# Patient Record
Sex: FEMALE | Race: WHITE | Employment: FULL TIME | URBAN - METROPOLITAN AREA
[De-identification: names, ages, dates, MRNs, and addresses within clinical notes are randomized per-mention and may not be internally consistent; named-entity substitution may affect disease eponyms.]

---

## 2017-01-04 RX ORDER — LISINOPRIL 5 MG/1
5 TABLET ORAL DAILY
Qty: 30 TAB | Refills: 0 | Status: SHIPPED | OUTPATIENT
Start: 2017-01-04 | End: 2017-04-04 | Stop reason: SDUPTHER

## 2017-01-04 RX ORDER — LISINOPRIL 5 MG/1
5 TABLET ORAL DAILY
Qty: 90 TAB | Refills: 0 | Status: SHIPPED | OUTPATIENT
Start: 2017-01-04 | End: 2017-01-04 | Stop reason: SDUPTHER

## 2017-01-04 NOTE — TELEPHONE ENCOUNTER
Refill is per verbal order of Dr. Rod Thao.    Requested Prescriptions     Pending Prescriptions Disp Refills    lisinopril (PRINIVIL, ZESTRIL) 5 mg tablet 30 Tab 0     Sig: Take 1 Tab by mouth daily. Signed Prescriptions Disp Refills    lisinopril (PRINIVIL, ZESTRIL) 5 mg tablet 90 Tab 0     Sig: Take 1 Tab by mouth daily.  NEEDS APPOINTMENT TO CONTINUE REFILLS     Authorizing Provider: Yvrose May     Ordering User: Roberto Mehta

## 2017-04-04 ENCOUNTER — TELEPHONE (OUTPATIENT)
Dept: CARDIOLOGY CLINIC | Age: 57
End: 2017-04-04

## 2017-04-04 RX ORDER — LISINOPRIL 5 MG/1
5 TABLET ORAL DAILY
Qty: 30 TAB | Refills: 0 | Status: SHIPPED | OUTPATIENT
Start: 2017-04-04 | End: 2017-05-26 | Stop reason: SDUPTHER

## 2017-04-04 NOTE — TELEPHONE ENCOUNTER
Refill is per verbal order of Dr. Jd Iglesias.    Requested Prescriptions     Pending Prescriptions Disp Refills    lisinopril (PRINIVIL, ZESTRIL) 5 mg tablet 30 Tab 0     Sig: Take 1 Tab by mouth daily.

## 2017-04-14 ENCOUNTER — OFFICE VISIT (OUTPATIENT)
Dept: CARDIOLOGY CLINIC | Age: 57
End: 2017-04-14

## 2017-04-14 VITALS
HEART RATE: 64 BPM | HEIGHT: 61 IN | WEIGHT: 181.6 LBS | BODY MASS INDEX: 34.29 KG/M2 | SYSTOLIC BLOOD PRESSURE: 145 MMHG | DIASTOLIC BLOOD PRESSURE: 70 MMHG

## 2017-04-14 DIAGNOSIS — Z98.890 H/O AORTIC ANEURYSM REPAIR: Primary | ICD-10-CM

## 2017-04-14 DIAGNOSIS — Z86.79 H/O AORTIC ANEURYSM REPAIR: Primary | ICD-10-CM

## 2017-04-14 DIAGNOSIS — I34.0 MITRAL VALVE INSUFFICIENCY, UNSPECIFIED ETIOLOGY: Primary | ICD-10-CM

## 2017-04-14 RX ORDER — ASPIRIN 81 MG/1
TABLET ORAL DAILY
COMMUNITY

## 2017-04-14 RX ORDER — DULOXETIN HYDROCHLORIDE 60 MG/1
60 CAPSULE, DELAYED RELEASE ORAL DAILY
COMMUNITY

## 2017-04-14 NOTE — MR AVS SNAPSHOT
Visit Information Date & Time Provider Department Dept. Phone Encounter #  
 4/14/2017 11:00 AM Michael Harrington MD [facility] [place] [phone] [id number] Your Appointments 6/6/2017  3:40 PM  
New Patient with Michael Harrington MD  
[facility] ([facility]) Appt Note: 6 week follow up  
 [address] [facility] [address] [place] [address] Road  
[address] [place] [id number] [address] Upcoming Health Maintenance Date Due Hepatitis C Screening 1960 DTaP/Tdap/Td series (1 - Tdap) 7/12/1981 FOBT Q 1 YEAR AGE 50-75 7/12/2010 PAP AKA CERVICAL CYTOLOGY 11/13/2012 INFLUENZA AGE 9 TO ADULT 8/1/2016 BREAST CANCER SCRN MAMMOGRAM 10/25/2018 Allergies as of 4/14/2017  Review Complete On: 4/14/2017 By: Michael Harrington MD  
  
 Severity Noted Reaction Type Reactions Contrast Agent [Iodine] High 04/23/2015   Not Verified Other (comments) Happened as a child but was sent to hospital for treatment. Current Immunizations  Reviewed on 5/3/2015 No immunizations on file. Not reviewed this visit You Were Diagnosed With   
  
 Codes Comments Mitral valve insufficiency, unspecified etiology    -  Primary ICD-10-CM: I34.0 ICD-9-CM: 424.0 Vitals BP Pulse Height(growth percentile) Weight(growth percentile) BMI OB Status 145/70 64 5' 1\" (1.549 m) 181 lb 9.6 oz (82.4 kg) 34.31 kg/m2 Hysterectomy Smoking Status Never Smoker Vitals History BMI and BSA Data Body Mass Index Body Surface Area  
 34.31 kg/m 2 1.88 m 2 Preferred Pharmacy Pharmacy Name Phone [id number] [address] [facility] [phone] Your Updated Medication List  
  
   
This list is accurate as of: 4/14/17 12:06 PM.  Always use your most recent med list.  
  
  
  
  
 aspirin delayed-release 81 mg tablet Take  by mouth daily. b complex vitamins tablet Take 1 Tab by mouth daily. bromelains 500 mg Tab Take  by mouth. CO Q-10 100 mg capsule Generic drug:  co-enzyme Q-10 Take 100 mg by mouth daily. CYMBALTA 60 mg capsule Generic drug:  DULoxetine Take 60 mg by mouth daily. gabapentin 100 mg capsule Commonly known as:  NEURONTIN  
200 mg daily. ibuprofen 200 mg tablet Commonly known as:  MOTRIN Take  by mouth daily as needed for Pain. lisinopril 5 mg tablet Commonly known as:  Dorette Joseph Take 1 Tab by mouth daily. metoprolol tartrate 50 mg tablet Commonly known as:  LOPRESSOR Take 1 Tab by mouth two (2) times a day. multivitamin tablet Commonly known as:  ONE A DAY Take 1 Tab by mouth daily. omeprazole 40 mg capsule Commonly known as:  PRILOSEC Take 40 mg by mouth Daily (before breakfast). pravastatin 10 mg tablet Commonly known as:  PRAVACHOL Take 10 mg by mouth nightly. VITAMIN C 500 mg tablet Generic drug:  ascorbic acid (vitamin C) Take  by mouth. VITAMIN D3 1,000 unit Cap Generic drug:  cholecalciferol Take  by mouth. We Performed the Following AMB POC EKG ROUTINE W/ 12 LEADS, INTER & REP [88638 CPT(R)] Introducing [facility]! Dear Mary Maki: Thank you for requesting a [facility] account. Our records indicate that you already have an active [facility] account. You can access your account anytime at [email or web address] Did you know that you can access your hospital and ER discharge instructions at any time in [facility]? You can also review all of your test results from your hospital stay or ER visit. Additional Information If you have questions, please visit the Frequently Asked Questions section of the [facility] website at [email or web address] Remember, [email or web address] is NOT to be used for urgent needs. For medical emergencies, dial 911. Now available from your iPhone and Android! Please provide this summary of care documentation to your next provider. Your primary care clinician is listed as Trish Del Real. If you have any questions after today's visit, please call [phone].

## 2017-04-14 NOTE — PROGRESS NOTES
LAST OFFICE VISIT : 5/26/2016        ICD-10-CM ICD-9-CM   1. Mitral valve insufficiency, unspecified etiology I34.0 424.0            Ananya Sims is a 64 y.o. female with dyslipidemia referred for annual follow up. Cardiac risk factors: family history, dyslipidemia, obesity, sedentary life style. I have personally obtained the history from the patient. HISTORY OF PRESENTING ILLNESS      She is doing well with no cardiac complaints today. She is sedentary and has gained weight recently. The patient denies chest pain/ shortness of breath, orthopnea, PND, LE edema, palpitations, syncope, presyncope or fatigue. ACTIVE PROBLEM LIST     Patient Active Problem List    Diagnosis Date Noted    Aortic stenosis 04/29/2015    Postoperative anemia due to acute blood loss 04/27/2015    Ascending aortic dissection (HCC) 04/25/2015    Aortic dissection ([facility] [address] 04/24/2015    Chest pain 04/23/2015    Hypercholesterolemia     Classical migraine     Anxiety and depression            PAST MEDICAL HISTORY     Past Medical History:   Diagnosis Date    Acquired absence of both cervix and uterus     Anxiety and depression     Ascending aortic dissection ([facility] [address] 4/25/2015    Re suspension of aortic valve and 26 mm Hemashield tube graft; circulatory arrest    Classical migraine     Hypercholesterolemia            PAST SURGICAL HISTORY     Past Surgical History:   Procedure Laterality Date    HX HYSTERECTOMY  2003    LAV--483g. path fibroids    HX MASTOPEXY (BREAST LIFT)  09/2006    HX TONSILLECTOMY      IN EVASC RPR AAA PSEUDOARYSM ABDL AORTA VISC RS&I  04/25/15    Aortic Valve Repair          ALLERGIES     Allergies   Allergen Reactions    Contrast Agent [Iodine] Other (comments)     Happened as a child but was sent to hospital for treatment.            FAMILY HISTORY     Family History   Problem Relation Age of Onset    Other Father      Aneurysm    Heart Disease Father     Other Sister      Aneurysm  Heart Disease Mother     Heart Disease Sister     Heart Disease Maternal Grandfather     Heart Attack Maternal Grandfather     Heart Attack Sister     negative for cardiac disease       SOCIAL HISTORY     Social History     Social History    Marital status:      Spouse name: N/A    Number of children: N/A    Years of education: N/A     Social History Main Topics    Smoking status: Never Smoker    Smokeless tobacco: Never Used    Alcohol use No    Drug use: No    Sexual activity: Yes     Partners: Male     Birth control/ protection: Surgical      Comment: Hysterectomy     Other Topics Concern    None     Social History Narrative         MEDICATIONS     Current Outpatient Prescriptions   Medication Sig    DULoxetine (CYMBALTA) 60 mg capsule Take 60 mg by mouth daily.  aspirin delayed-release 81 mg tablet Take  by mouth daily.  lisinopril (PRINIVIL, ZESTRIL) 5 mg tablet Take 1 Tab by mouth daily.  metoprolol tartrate (LOPRESSOR) 50 mg tablet Take 1 Tab by mouth two (2) times a day.  co-enzyme Q-10 (CO Q-10) 100 mg capsule Take 100 mg by mouth daily.  ascorbic acid (VITAMIN C) 500 mg tablet Take  by mouth.  b complex vitamins tablet Take 1 Tab by mouth daily.  Cholecalciferol, Vitamin D3, (VITAMIN D3) 1,000 unit cap Take  by mouth.  multivitamin (ONE A DAY) tablet Take 1 Tab by mouth daily.  bromelains 500 mg tab Take  by mouth.  ibuprofen (MOTRIN) 200 mg tablet Take  by mouth daily as needed for Pain.  gabapentin (NEURONTIN) 100 mg capsule 200 mg daily.  omeprazole (PRILOSEC) 40 mg capsule Take 40 mg by mouth Daily (before breakfast).  pravastatin (PRAVACHOL) 10 mg tablet Take 10 mg by mouth nightly. No current facility-administered medications for this visit. I have reviewed the nurses notes, vitals, problem list, allergy list, medical history, family, social history and medications.        REVIEW OF SYMPTOMS      General: Pt denies excessive weight gain or loss. Pt is able to conduct ADL's  HEENT: Denies blurred vision, headaches, hearing loss, epistaxis and difficulty swallowing. Respiratory: Denies cough, congestion, shortness of breath, CUELLO, wheezing or stridor. Cardiovascular: Denies precordial pain, palpitations, edema or PND  Gastrointestinal: Denies poor appetite, indigestion, abdominal pain or blood in stool  Genitourinary: Denies hematuria, dysuria, increased urinary frequency  Musculoskeletal: Denies joint pain or swelling from muscles or joints  Neurologic: Denies tremor, paresthesias, headache, or sensory motor disturbance  Psychiatric: Denies confusion, insomnia, depression  Integumentray: Denies rash, itching or ulcers. Hematologic: Denies easy bruising, bleeding     PHYSICAL EXAMINATION      Vitals:    04/14/17 1124   BP: 145/70   Pulse: 64   Weight: 181 lb 9.6 oz (82.4 kg)   Height: 5' 1\" (1.549 m)     BP recheck: 120/72    General: Well developed, in no acute distress. HEENT: No jaundice, oral mucosa moist, no oral ulcers  Neck: Supple, no stiffness, no lymphadenopathy, supple  Heart:  Normal S1/S2 negative S3 or S4. Regular, no murmur, gallop or rub, no jugular venous distention  Respiratory: Clear bilaterally x 4, no wheezing or rales  Extremities:  No edema, normal cap refill, no cyanosis. Musculoskeletal: No clubbing, no deformities  Neuro: A&Ox3, speech clear, gait stable, cooperative, no focal neurologic deficits  Skin: Skin color is normal. No rashes or lesions. Non diaphoretic, moist.  Vascular: 2+ pulses symmetric in all extremities        EKG: sinus bradycardia at 58 bpm with non specific STT changes, unchanged from previous EKG in 2015      DIAGNOSTIC DATA     1. Echocardiogram   5/22/15 - 45-55% LAE, mild to moderate MR    2. Cholesterol profile   4/24/15 - , HDL 45, ,   7/24/15 - , HDL 34, LDL 69,   1/26/16 - , HDL 47, ,     3.  Carotid duplex/Vascular studies:   4/24/15 - Right 0-49%, Left 0-49%    4. Cardiac MRI  8/4/15   1. Patient is status post interposition tube graft for ascending aortic   dissection. The ascending aorta tube graft extends from the sinotubular   junction to the aortic arch. In the mid section of the tube graft there is a   kink without limitation of the flow. Proximally the ascending aortic tube   graft measures 27 mm. In the mid section where theere is a kink the tube   graft measures 33 mm and distally it measures 25 mm prior to termination in   the aortic arch. There is no perigraft collection. Normal sinus of Valsalva. The aortic arch and proximal descending thoracic aorta is mildly dilated at   35 mm. 2. There is residual dissection extending from the level of the left common   carotid artery and the aortic arch down to the descending thoracic aorta and   abdominal aorta aorta and extending into the left common iliac and   terminating in the proximal portion of the left external iliac artery. The   right common iliac and the left internal iliac artery are free of   dissection. As expected the false lumen is larger than the true lumen. The   celiac trunk and SMA takeoff is from the true lumen. Both renal arteries   appeared to originate from the false lumen. There is no aneurysm of the   descending thoracic aorta or abdominal aorta. 3. Normal trileaflet aortic valve. No significant aortic regurgitation. 4. Mild 1+ mitral regurgitation. 5. Mild 1+ tricuspid regurgitation. 6. Normal left ventricular size and systolic function. LVEF 60%. 7. Normal right ventricular size and systolic function. RVEF 55%. 8. Normal pleura and pericardium. There is no significant effusions. 9. No intravenous contrast was used for the study. 9/4/15  1. Postoperative changes ascending aorta. Small kink within the graft   unchanged  2. Caliber of the descending thoracic aorta and abdominal aorta unchanged. 3. No paracardial effusion  4.  Slow the flow versus partial thrombosis of the proximal descending   thoracic aorta false lumen    3/8/16  1. No interval change in postoperative findings from a descending aorta   dissection repair and associated appearance and size of the ace ascending   and descending aorta. 2. No change in the appearance of the residual descending aortic dissection   with true and false lumens. LABORATORY DATA            Lab Results   Component Value Date/Time    WBC 11.1 09/04/2015 12:20 PM    Hemoglobin (POC) 13.3 09/04/2015 12:32 PM    HGB 11.8 09/04/2015 12:20 PM    Hematocrit (POC) 39 09/04/2015 12:32 PM    HCT 37.8 09/04/2015 12:20 PM    PLATELET 418 37/85/3573 12:20 PM    MCV 79.4 09/04/2015 12:20 PM      Lab Results   Component Value Date/Time    Sodium 137 05/06/2015 04:57 AM    Potassium 3.6 05/06/2015 04:57 AM    Chloride 99 05/06/2015 04:57 AM    CO2 31 05/06/2015 04:57 AM    Anion gap 7 05/06/2015 04:57 AM    Glucose 109 05/06/2015 04:57 AM    BUN 14 05/06/2015 04:57 AM    Creatinine 0.58 05/06/2015 04:57 AM    BUN/Creatinine ratio 24 05/06/2015 04:57 AM    GFR est AA >60 05/06/2015 04:57 AM    GFR est non-AA >60 05/06/2015 04:57 AM    Calcium 8.7 05/06/2015 04:57 AM    Bilirubin, total 0.4 09/04/2015 12:20 PM    AST (SGOT) 19 09/04/2015 12:20 PM    Alk.  phosphatase 157 09/04/2015 12:20 PM    Protein, total 8.0 09/04/2015 12:20 PM    Albumin 4.0 09/04/2015 12:20 PM    Globulin 4.0 09/04/2015 12:20 PM    A-G Ratio 1.0 09/04/2015 12:20 PM    ALT (SGPT) 23 09/04/2015 12:20 PM           ASSESSMENT/RECOMMENDATIONS:.      1. Aortic aneurysm repair  -she is doing well from that standpoint   -has not had more recent [address]  -last was in March 2016 with no changes in the descending aortic aneurysm  -she did have tube graft of the ascending aneurysm with no obstruction   -1+ MR and LV function of 60%  -she does have some occasional chest discomfort so far going forward with another cardiac MRI  -after this is completed will give her exercise program    2. Dyslipidemia  -lipids followed by PCP and are close to goal      3. Return in 6 weeks or PRN    Orders Placed This Encounter    AMB POC EKG ROUTINE W/ 12 LEADS, INTER & REP     Order Specific Question:   Reason for Exam:     Answer:       DULoxetine (CYMBALTA) 60 mg capsule     Sig: Take 60 mg by mouth daily.  aspirin delayed-release 81 mg tablet     Sig: Take  by mouth daily. Follow-up Disposition: Not on File      I have discussed the diagnosis with  Franco Mcpherson and the intended plan as seen in the above orders. Questions were answered concerning future plans. I have discussed medication side effects and warnings with the patient as well. Thank you,  Cleveland Mccabe MD for involving me in the care of  Franco Mcpherson. Please do not hesitate to contact me for further questions/concerns. This note was written by bobbi Og, as dictated by Diane Francois MD.      Mena Staff. MD J[place], [address] [place] [address] Rd[address]      [facility], [address] [place] [address]      [phone] / [phone] Fax

## 2017-04-14 NOTE — PROGRESS NOTES
Visit Vitals    /70    Pulse 64    Ht 5' 1\" (1.549 m)    Wt 181 lb 9.6 oz (82.4 kg)    BMI 34.31 kg/m2     Medication changes for this visit VO Dr. Carmelina Lefort

## 2017-04-20 NOTE — TELEPHONE ENCOUNTER
Refill is per verbal order of dr. Darling Knutson.    Requested Prescriptions     Pending Prescriptions Disp Refills    metoprolol tartrate (LOPRESSOR) 50 mg tablet 180 Tab 0     Sig: Take 1 Tab by mouth two (2) times a day.

## 2017-04-24 RX ORDER — METOPROLOL TARTRATE 50 MG/1
50 TABLET ORAL 2 TIMES DAILY
Qty: 180 TAB | Refills: 0 | Status: SHIPPED | OUTPATIENT
Start: 2017-04-24 | End: 2017-07-26 | Stop reason: SDUPTHER

## 2017-05-15 ENCOUNTER — HOSPITAL ENCOUNTER (OUTPATIENT)
Dept: MRI IMAGING | Age: 57
Discharge: HOME | End: 2017-05-15
Attending: SPECIALIST
Payer: COMMERCIAL

## 2017-05-15 DIAGNOSIS — Z86.79 H/O AORTIC ANEURYSM REPAIR: ICD-10-CM

## 2017-05-15 DIAGNOSIS — Z98.890 H/O AORTIC ANEURYSM REPAIR: ICD-10-CM

## 2017-05-15 PROCEDURE — 75557 CARDIAC MRI FOR MORPH: CPT

## 2017-05-26 RX ORDER — LISINOPRIL 5 MG/1
5 TABLET ORAL DAILY
Qty: 30 TAB | Refills: 0 | Status: SHIPPED | OUTPATIENT
Start: 2017-05-26 | End: 2017-07-03 | Stop reason: SDUPTHER

## 2017-05-26 NOTE — TELEPHONE ENCOUNTER
Refill is per verbal order of Dr. Dia Henriquez.    Requested Prescriptions     Pending Prescriptions Disp Refills    lisinopril (PRINIVIL, ZESTRIL) 5 mg tablet 30 Tab 0     Sig: Take 1 Tab by mouth daily.

## 2017-06-06 ENCOUNTER — OFFICE VISIT (OUTPATIENT)
Dept: CARDIOLOGY CLINIC | Age: 57
End: 2017-06-06

## 2017-06-06 VITALS
SYSTOLIC BLOOD PRESSURE: 112 MMHG | OXYGEN SATURATION: 97 % | DIASTOLIC BLOOD PRESSURE: 68 MMHG | HEIGHT: 61 IN | RESPIRATION RATE: 18 BRPM | HEART RATE: 72 BPM | WEIGHT: 182.4 LBS | BODY MASS INDEX: 34.44 KG/M2

## 2017-06-06 DIAGNOSIS — Z82.49 FAMILY HISTORY OF CAROTID ARTERY STENOSIS: Primary | ICD-10-CM

## 2017-06-06 DIAGNOSIS — I71.010 ASCENDING AORTIC DISSECTION: ICD-10-CM

## 2017-06-06 DIAGNOSIS — E78.00 HYPERCHOLESTEROLEMIA: ICD-10-CM

## 2017-06-06 NOTE — PROGRESS NOTES
LAST OFFICE VISIT : 4/14/2017        ICD-10-CM ICD-9-CM   1. Family history of carotid artery stenosis Z82.49 V17.49   2. Ascending aortic dissection (HCC) I71.01 441.01   3. Hypercholesterolemia E78.00 272.0            Sherri Noland is a 64 y.o. female with dyslipidemia referred for 6 week follow up. Cardiac risk factors: family history, dyslipidemia, obesity, sedentary life style. I have personally obtained the history from the patient. HISTORY OF PRESENTING ILLNESS      She is doing well. She reports occasional twinges of chest discomfort. She is here to review MRI results. She tells me that her father had carotid disease and on her last US she had some disease as well. The patient denies shortness of breath, orthopnea, PND, LE edema, palpitations, syncope, presyncope or fatigue. ACTIVE PROBLEM LIST     Patient Active Problem List    Diagnosis Date Noted    Aortic stenosis 04/29/2015    Postoperative anemia due to acute blood loss 04/27/2015    Ascending aortic dissection (HCC) 04/25/2015    Aortic dissection ([facility] [address] 04/24/2015    Chest pain 04/23/2015    Hypercholesterolemia     Classical migraine     Anxiety and depression            PAST MEDICAL HISTORY     Past Medical History:   Diagnosis Date    Acquired absence of both cervix and uterus     Anxiety and depression     Ascending aortic dissection ([address] 4/25/2015    Re suspension of aortic valve and 26 mm Hemashield tube graft; circulatory arrest    Classical migraine     Hypercholesterolemia            PAST SURGICAL HISTORY     Past Surgical History:   Procedure Laterality Date    HX HYSTERECTOMY  2003    [facility]--483g.  path fibroids    HX MASTOPEXY (BREAST LIFT)  09/2006    HX TONSILLECTOMY      [place] EVASC RPR AAA PSEUDOARYSM ABDL AORTA VISC RS&I  04/25/15    Aortic Valve Repair          ALLERGIES     Allergies   Allergen Reactions    Contrast Agent [Iodine] Other (comments)     Happened as a child but was sent to hospital for treatment. FAMILY HISTORY     Family History   Problem Relation Age of Onset    Other Father      Aneurysm    Heart Disease Father     Other Sister      Aneurysm    Heart Disease Mother     Heart Disease Sister     Heart Disease Maternal Grandfather     Heart Attack Maternal Grandfather     Heart Attack Sister     negative for cardiac disease       SOCIAL HISTORY     Social History     Social History    Marital status:      Spouse name: N/A    Number of children: N/A    Years of education: N/A     Social History Main Topics    Smoking status: Never Smoker    Smokeless tobacco: Never Used    Alcohol use No    Drug use: No    Sexual activity: Yes     Partners: Male     Birth control/ protection: Surgical      Comment: Hysterectomy     Other Topics Concern    None     Social History Narrative         MEDICATIONS     Current Outpatient Prescriptions   Medication Sig    lisinopril (PRINIVIL, ZESTRIL) 5 mg tablet Take 1 Tab by mouth daily.  metoprolol tartrate (LOPRESSOR) 50 mg tablet Take 1 Tab by mouth two (2) times a day.  DULoxetine (CYMBALTA) 60 mg capsule Take 60 mg by mouth daily.  aspirin delayed-release 81 mg tablet Take  by mouth daily.  multivitamin (ONE A DAY) tablet Take 1 Tab by mouth daily.  ibuprofen (MOTRIN) 200 mg tablet Take  by mouth daily as needed for Pain.  gabapentin (NEURONTIN) 100 mg capsule 200 mg daily.  omeprazole (PRILOSEC) 40 mg capsule Take 40 mg by mouth Daily (before breakfast).  pravastatin (PRAVACHOL) 10 mg tablet Take 10 mg by mouth nightly.  co-enzyme Q-10 (CO Q-10) 100 mg capsule Take 100 mg by mouth daily.  ascorbic acid (VITAMIN C) 500 mg tablet Take  by mouth.  b complex vitamins tablet Take 1 Tab by mouth daily.  Cholecalciferol, Vitamin D3, (VITAMIN D3) 1,000 unit cap Take  by mouth.  bromelains 500 mg tab Take  by mouth. No current facility-administered medications for this visit.         I have reviewed the nurses notes, vitals, problem list, allergy list, medical history, family, social history and medications. REVIEW OF SYMPTOMS      General: Pt denies excessive weight gain or loss. Pt is able to conduct ADL's  HEENT: Denies blurred vision, headaches, hearing loss, epistaxis and difficulty swallowing. Respiratory: Denies cough, congestion, shortness of breath, CUELLO, wheezing or stridor. Cardiovascular: occasional insignificant precordial pain, palpitations, edema or PND  Gastrointestinal: Denies poor appetite, indigestion, abdominal pain or blood in stool  Genitourinary: Denies hematuria, dysuria, increased urinary frequency  Musculoskeletal: Denies joint pain or swelling from muscles or joints  Neurologic: Denies tremor, paresthesias, headache, or sensory motor disturbance  Psychiatric: Denies confusion, insomnia, depression  Integumentray: Denies rash, itching or ulcers. Hematologic: Denies easy bruising, bleeding     PHYSICAL EXAMINATION      Vitals:    06/06/17 1542   BP: 112/68   Pulse: 72   Resp: 18   SpO2: 97%   Weight: 182 lb 6.4 oz (82.7 kg)   Height: 5' 1\" (1.549 m)     General: Well developed, in no acute distress. HEENT: No jaundice, oral mucosa moist, no oral ulcers  Neck: Supple, no stiffness, no lymphadenopathy, supple  Heart:  Normal S1/S2 negative S3 or S4. Regular, no murmur, gallop or rub, no jugular venous distention  Respiratory: Clear bilaterally x 4, no wheezing or rales  Abdomen:   Soft, non-tender, bowel sounds are active.   Extremities:  No edema, normal cap refill, no cyanosis. Musculoskeletal: No clubbing, no deformities  Neuro: A&Ox3, speech clear, gait stable, cooperative, no focal neurologic deficits  Skin: Skin color is normal. No rashes or lesions. Non diaphoretic, moist.  Vascular: 2+ pulses symmetric in all extremities        EKG:      DIAGNOSTIC DATA     1. Echocardiogram   5/22/15 - 45-55% LAE, mild to moderate MR  5/26/17- EF 60%, MR/TR/RI mild    2. Cholesterol profile   4/24/15 - , HDL 45, ,   7/24/15 - , HDL 34, LDL 69,   1/26/16 - , HDL 47, ,   3/12/17- , HDL 42,     3. Carotid duplex/Vascular studies:   4/24/15 - Right 0-49%, Left 0-49%    4. Cardiac MRI  8/4/15   1. Patient is status post interposition tube graft for ascending aortic   dissection. The ascending aorta tube graft extends from the sinotubular   junction to the aortic arch. In the mid section of the tube graft there is a   kink without limitation of the flow. Proximally the ascending aortic tube   graft measures 27 mm. In the mid section where theere is a kink the tube   graft measures 33 mm and distally it measures 25 mm prior to termination in   the aortic arch. There is no perigraft collection. Normal sinus of Valsalva. The aortic arch and proximal descending thoracic aorta is mildly dilated at   35 mm. 2. There is residual dissection extending from the level of the left common   carotid artery and the aortic arch down to the descending thoracic aorta and   abdominal aorta aorta and extending into the left common iliac and   terminating in the proximal portion of the left external iliac artery. The   right common iliac and the left internal iliac artery are free of   dissection. As expected the false lumen is larger than the true lumen. The   celiac trunk and SMA takeoff is from the true lumen. Both renal arteries   appeared to originate from the false lumen. There is no aneurysm of the   descending thoracic aorta or abdominal aorta. 3. Normal trileaflet aortic valve. No significant aortic regurgitation. 4. Mild 1+ mitral regurgitation. 5. Mild 1+ tricuspid regurgitation. 6. Normal left ventricular size and systolic function. LVEF 60%. 7. Normal right ventricular size and systolic function. RVEF 55%. 8. Normal pleura and pericardium. There is no significant effusions.   9. No intravenous contrast was used for the study.    9/4/15  1. Postoperative changes ascending aorta. Small kink within the graft   unchanged  2. Caliber of the descending thoracic aorta and abdominal aorta unchanged. 3. No paracardial effusion  4. Slow the flow versus partial thrombosis of the proximal descending   thoracic aorta false lumen    3/8/16  1. No interval change in postoperative findings from a descending aorta   dissection repair and associated appearance and size of the ace ascending   and descending aorta. 2. No change in the appearance of the residual descending aortic dissection   with true and false lumens. LABORATORY DATA            Lab Results   Component Value Date/Time    WBC 11.1 09/04/2015 12:20 PM    Hemoglobin (POC) 13.3 09/04/2015 12:32 PM    HGB 11.8 09/04/2015 12:20 PM    Hematocrit (POC) 39 09/04/2015 12:32 PM    HCT 37.8 09/04/2015 12:20 PM    PLATELET 878 22/90/5315 12:20 PM    MCV 79.4 09/04/2015 12:20 PM      Lab Results   Component Value Date/Time    Sodium 137 05/06/2015 04:57 AM    Potassium 3.6 05/06/2015 04:57 AM    Chloride 99 05/06/2015 04:57 AM    CO2 31 05/06/2015 04:57 AM    Anion gap 7 05/06/2015 04:57 AM    Glucose 109 05/06/2015 04:57 AM    BUN 14 05/06/2015 04:57 AM    Creatinine 0.58 05/06/2015 04:57 AM    BUN/Creatinine ratio 24 05/06/2015 04:57 AM    GFR est AA >60 05/06/2015 04:57 AM    GFR est non-AA >60 05/06/2015 04:57 AM    Calcium 8.7 05/06/2015 04:57 AM    Bilirubin, total 0.4 09/04/2015 12:20 PM    AST (SGOT) 19 09/04/2015 12:20 PM    Alk. phosphatase 157 09/04/2015 12:20 PM    Protein, total 8.0 09/04/2015 12:20 PM    Albumin 4.0 09/04/2015 12:20 PM    Globulin 4.0 09/04/2015 12:20 PM    A-G Ratio 1.0 09/04/2015 12:20 PM    ALT (SGPT) 23 09/04/2015 12:20 PM           ASSESSMENT/RECOMMENDATIONS:.      1. Aortic aneurysm repair  -cardiac MRI showed no change in the aneurysm   -overall things are stable from that point      2.  Dyslipidemia  -TG's elevated but she was not fasting so this will be checked again    3. Family history of carotid disease   -will check carotid ultrasounds       4. Return in 6 months or PRN    Orders Placed This Encounter    Carotid Duplex - Bilateral Scan (70567)        Follow-up Disposition:  Return in about 6 months (around 12/6/2017). I have discussed the diagnosis with  Jacob Aviles and the intended plan as seen in the above orders. Questions were answered concerning future plans. I have discussed medication side effects and warnings with the patient as well. Thank you,  Petra Guzmán MD for involving me in the care of  Jacob Aviles. Please do not hesitate to contact me for further questions/concerns. This note was written by bobbi Ching, as dictated by Krunal Feng MD.      Arelis Pino. MD [place], [address]  [address] [place] [address], [address] [place] [address] Drive      [phone] / [phone] Fax

## 2017-06-06 NOTE — PROGRESS NOTES
Chief Complaint   Patient presents with    Mitral Valve Prolapse/regurgitation/insufficiency     6 week follow uo    Cholesterol Problem     Dyslipidemia    Results     Discuss MRI Results     Visit Vitals    /68 (BP 1 Location: Right arm, BP Patient Position: Sitting)    Pulse 72    Resp 18    Ht 5' 1\" (1.549 m)    Wt 182 lb 6.4 oz (82.7 kg)    SpO2 97%    BMI 34.46 kg/m2

## 2017-07-26 ENCOUNTER — TELEPHONE (OUTPATIENT)
Dept: CARDIOLOGY CLINIC | Age: 57
End: 2017-07-26

## 2017-07-28 RX ORDER — METOPROLOL TARTRATE 50 MG/1
50 TABLET ORAL 2 TIMES DAILY
Qty: 180 TAB | Refills: 2 | Status: SHIPPED | OUTPATIENT
Start: 2017-07-28 | End: 2018-04-23 | Stop reason: SDUPTHER

## 2017-07-28 NOTE — TELEPHONE ENCOUNTER
Per Dr. Jarrell Sharpe last note 6/6/17. Patient needs to be scheduled for cartoids in the near future. Left a message for patient to return my call.

## 2017-07-28 NOTE — TELEPHONE ENCOUNTER
Requested Prescriptions     Signed Prescriptions Disp Refills    metoprolol tartrate (LOPRESSOR) 50 mg tablet 180 Tab 2     Sig: Take 1 Tab by mouth two (2) times a day.      Authorizing Provider: Estelle Laguna     Ordering User: Hannah Arellano

## 2017-08-02 ENCOUNTER — CLINICAL SUPPORT (OUTPATIENT)
Dept: CARDIOLOGY CLINIC | Age: 57
End: 2017-08-02

## 2017-08-02 DIAGNOSIS — I65.23 CAROTID STENOSIS, BILATERAL: Primary | ICD-10-CM

## 2017-08-02 NOTE — PROCEDURES
[facility]  *** FINAL REPORT ***    Name: Abhijeet Anthony  MRN: [id number]       Outpatient  : 1960  HIS Order #: [id number]  [address] [facility] Visit #: [id number]  Date: 02 Aug 2017    TYPE OF TEST: Cerebrovascular Duplex    REASON FOR TEST  Known carotid stenosis    Right Carotid:-             Proximal               Mid                 Distal  cm/s  Systolic  Diastolic  Systolic  Diastolic  Systolic  Diastolic  CCA:     87.5      10.0                            61.0      15.0  Bulb:  ECA:     80.0      11.0  ICA:     47.0      13.0       55.0      19.0       78.0      30.0  ICA/CCA:  0.8       0.9    ICA Stenosis: Normal    Right Vertebral:-  Finding: Antegrade  Sys:       45.0  Osiris:       15.0    Right Subclavian:    Left Carotid:-            Proximal                Mid                 Distal  cm/s  Systolic  Diastolic  Systolic  Diastolic  Systolic  Diastolic  CCA:     54.7      14.0                            61.0      18.0  Bulb:  ECA:     76.0      11.0  ICA:     52.0      12.0       61.0      17.0       75.0      35.0  ICA/CCA:  0.9       0.7    ICA Stenosis: Normal    Left Vertebral:-  Finding: Antegrade  Sys:       71.0  Osiris:       19.0    Left Subclavian:    INTERPRETATION/FINDINGS  PROCEDURE:  Evaluation of the extracranial cerebrovascular arteries  with ultrasound (B-mode imaging, pulsed Doppler, color Doppler). Includes the common carotid, internal carotid, external carotid, and  vertebral arteries. FINDINGS:  Grayscale imaging reveals mild intimal thickening within  the common carotid arteries bilaterally. No significant flow  disturbance is noted on color flow imaging and peak systolic  velocities are normal at 78 cm/s on the right and 75 cm/s on the left. IMPRESSION: Findings are consistent with 0-9% stenosis of the right  internal carotid and 0-9% stenosis of the left internal carotid. Vertebrals are patent with antegrade flow.     COMPARISON:  In comparison to the previous study done on 4/25/2015,  there is no evidence of a significant progression of stenosis on  today's exam.    ADDITIONAL COMMENTS    I have personally reviewed the data relevant to the interpretation of  this  study.     TECHNOLOGIST: CHESTER Albarran  Signed: 08/02/2017 08:58 AM    PHYSICIAN: Nicci Quiroz MD  Signed: 08/03/2017 08:08 PM

## 2017-08-14 ENCOUNTER — DOCUMENTATION ONLY (OUTPATIENT)
Dept: CARDIOLOGY CLINIC | Age: 57
End: 2017-08-14

## 2017-12-11 ENCOUNTER — OFFICE VISIT (OUTPATIENT)
Dept: OBGYN CLINIC | Age: 57
End: 2017-12-11

## 2017-12-11 VITALS
WEIGHT: 189 LBS | BODY MASS INDEX: 35.68 KG/M2 | SYSTOLIC BLOOD PRESSURE: 128 MMHG | HEIGHT: 61 IN | DIASTOLIC BLOOD PRESSURE: 64 MMHG

## 2017-12-11 DIAGNOSIS — Z01.419 ENCOUNTER FOR GYNECOLOGICAL EXAMINATION WITHOUT ABNORMAL FINDING: Primary | ICD-10-CM

## 2017-12-11 NOTE — PROGRESS NOTES
Annual exam ages 40-58 post hysterectomy    Brandon Hammer is a ,  62 y.o. female [facility] No LMP recorded. Patient has had a hysterectomy. .    She presents for her annual checkup. She is having no significant problems. With regard to the Gardasil vaccine, she is older than the age for which it is FDA approved. Hormonal status:  She reports no perimenstrual type symptoms. She is not having vasomotor symptoms. The patient is not using any ERT. Sexual history:    She  reports that she currently engages in sexual activity and has had male partners. She reports using the following method of birth control/protection: Surgical.    Medical conditions:    Since her last annual GYN exam about one year ago, she has not the following changes in her health history: none. Surgical history confirmed with patient. has a past surgical history that includes mastopexy (breast lift) (2006); tonsillectomy; evasc rpr aaa pseudoarysm abdl aorta visc rs&i (04/25/15); and total vaginal hysterectomy (). Pap and Mammogram History:    Her most recent Pap smear was normal, obtained 8 year(s) ago. .    The patient has not had a recent mammogram.  Last October was normal.    Breast Cancer History/Substance Abuse: negative    Osteoporosis History:    Family history does not include a first or second degree relative with osteopenia or osteoporosis. A bone density scan has not been obtained. She is currently taking vit D.     Past Medical History:   Diagnosis Date    Acquired absence of both cervix and uterus     Anxiety and depression     Ascending aortic dissection ([address] 2015    Re suspension of aortic valve and 26 mm Hemashield tube graft; circulatory arrest    Classical migraine     Fibroids     Hypercholesterolemia      Past Surgical History:   Procedure Laterality Date    HX MASTOPEXY (BREAST LIFT)  2006    HX TONSILLECTOMY      HX TOTAL VAGINAL HYSTERECTOMY      [place] EVASC RPR AAA PSEUDOARYSM ABDL AORTA VISC RS&I  04/25/15    Aortic Valve Repair       Current Outpatient Prescriptions   Medication Sig Dispense Refill    metoprolol tartrate (LOPRESSOR) 50 mg tablet Take 1 Tab by mouth two (2) times a day. 180 Tab 2    lisinopril (PRINIVIL, ZESTRIL) 5 mg tablet Take 1 Tab by mouth daily. 30 Tab 6    DULoxetine (CYMBALTA) 60 mg capsule Take 60 mg by mouth daily.  aspirin delayed-release 81 mg tablet Take  by mouth daily.  co-enzyme Q-10 (CO Q-10) 100 mg capsule Take 100 mg by mouth daily.  ascorbic acid (VITAMIN C) 500 mg tablet Take  by mouth.  b complex vitamins tablet Take 1 Tab by mouth daily.  Cholecalciferol, Vitamin D3, (VITAMIN D3) 1,000 unit cap Take  by mouth.  multivitamin (ONE A DAY) tablet Take 1 Tab by mouth daily.  ibuprofen (MOTRIN) 200 mg tablet Take  by mouth daily as needed for Pain.  gabapentin (NEURONTIN) 100 mg capsule 200 mg daily.  omeprazole (PRILOSEC) 40 mg capsule Take 40 mg by mouth Daily (before breakfast).  pravastatin (PRAVACHOL) 10 mg tablet Take 10 mg by mouth nightly.  bromelains 500 mg tab Take  by mouth. Allergies: Contrast agent [iodine]     Tobacco History:  reports that she has never smoked. She has never used smokeless tobacco.  Alcohol Abuse:  reports that she does not drink alcohol. Drug Abuse:  reports that she does not use illicit drugs.     Family Medical/Cancer History:   Family History   Problem Relation Age of Onset    Other Father      Aneurysm    Heart Disease Father     Other Sister      Aneurysm    Heart Disease Mother     Heart Disease Sister     Heart Disease Maternal Grandfather     Heart Attack Maternal Grandfather     Heart Attack Sister         Review of Systems - History obtained from the patient  Constitutional: negative for weight loss, fever, night sweats  HEENT: negative for hearing loss, earache, congestion, snoring, sorethroat  CV: negative for chest pain, palpitations, edema  Resp: negative for cough, shortness of breath, wheezing  GI: negative for change in bowel habits, abdominal pain, black or bloody stools  : negative for frequency, dysuria, hematuria, vaginal discharge  MSK: negative for back pain, joint pain, muscle pain  Breast: negative for breast lumps, nipple discharge, galactorrhea  Skin :negative for itching, rash, hives  Neuro: negative for dizziness, headache, confusion, weakness  Psych: negative for anxiety, depression, change in mood  Heme/lymph: negative for bleeding, bruising, pallor    Physical Exam    Visit Vitals    /64    Ht 5' 1\" (1.549 m)    Wt 189 lb (85.7 kg)    BMI 35.71 kg/m2     Constitutional  · Appearance: well-nourished, well developed, alert, in no acute distress    HENT  · Head and Face: appears normal    Neck  · Inspection/Palpation: normal appearance, no masses or tenderness  · Lymph Nodes: no lymphadenopathy present  · Thyroid: gland size normal, nontender, no nodules or masses present on palpation    Chest  · Respiratory Effort: breathing unlabored  · Auscultation: normal breath sounds    Cardiovascular  · Heart:  · Auscultation: regular rate and rhythm without murmur    Breasts  · Inspection of Breasts: breasts symmetrical, no skin changes, no discharge present, nipple appearance normal, no skin retraction present  · Palpation of Breasts and Axillae: no masses present on palpation, no breast tenderness  · Axillary Lymph Nodes: no lymphadenopathy present    Gastrointestinal  · Abdominal Examination: abdomen non-tender to palpation, normal bowel sounds, no masses present  · Liver and spleen: no hepatomegaly present, spleen not palpable  · Hernias: no hernias identified    Genitourinary  · External Genitalia: normal appearance for age, no discharge present, no tenderness present, no inflammatory lesions present, no masses present, no atrophy present  · Vagina: normal vaginal vault without central or paravaginal defects, no discharge present, no inflammatory lesions present, no masses present  · Bladder: non-tender to palpation  · Urethra: appears normal  · Cervix: absent  · Uterus: absent  · Adnexa: no adnexal tenderness present, no adnexal masses present  · Perineum: perineum within normal limits, no evidence of trauma, no rashes or skin lesions present  · Anus: anus within normal limits, no hemorrhoids present  · Inguinal Lymph Nodes: no lymphadenopathy present    Skin  · General Inspection: no rash, no lesions identified    Neurologic/Psychiatric  · Mental Status:  · Orientation: grossly oriented to person, place and time  · Mood and Affect: mood normal, affect appropriate    Assessment:  Routine gynecologic examination  Her current medical status is satisfactory with no evidence of significant gynecologic issues.     Plan:  Counseled re: diet, exercise, healthy lifestyle  Return for yearly wellness visits  Rec annual mammogram

## 2017-12-12 ENCOUNTER — OFFICE VISIT (OUTPATIENT)
Dept: CARDIOLOGY CLINIC | Age: 57
End: 2017-12-12

## 2017-12-12 VITALS
HEART RATE: 63 BPM | WEIGHT: 187.4 LBS | DIASTOLIC BLOOD PRESSURE: 68 MMHG | HEIGHT: 61 IN | OXYGEN SATURATION: 99 % | RESPIRATION RATE: 16 BRPM | SYSTOLIC BLOOD PRESSURE: 126 MMHG | BODY MASS INDEX: 35.38 KG/M2

## 2017-12-12 DIAGNOSIS — I71.010 ASCENDING AORTIC DISSECTION: ICD-10-CM

## 2017-12-12 DIAGNOSIS — I65.23 CAROTID STENOSIS, BILATERAL: Primary | ICD-10-CM

## 2017-12-12 DIAGNOSIS — E78.00 HYPERCHOLESTEROLEMIA: ICD-10-CM

## 2017-12-12 NOTE — MR AVS SNAPSHOT
Visit Information Date & Time Provider Department Dept. Phone Encounter #  
 12/12/2017  4:00 PM Roberto Matthews MD [facility] Lola Alfonso [phone] [id number] Follow-up Instructions Return in about 6 months (around 6/12/2018). Your Appointments 12/20/2017  7:40 AM  
MAMMOGRAPHY with MAMMOGRAM, DEBBIE Pan ([facility]) Appt Note: mammo only,  hw pt  
 [address] [facility] [phone]  
[facility] [address] Upcoming Health Maintenance Date Due Hepatitis C Screening 1960 DTaP/Tdap/Td series (1 - Tdap) 7/12/1981 FOBT Q 1 YEAR AGE 50-75 7/12/2010 PAP AKA CERVICAL CYTOLOGY 11/13/2012 Influenza Age 5 to Adult 8/1/2017 Allergies as of 12/12/2017  Review Complete On: 12/12/2017 By: Roberto Matthews MD  
  
 Severity Noted Reaction Type Reactions Contrast Agent [Iodine] High 04/23/2015   Not Verified Other (comments) Happened as a child but was sent to hospital for treatment. Current Immunizations  Reviewed on 5/3/2015 No immunizations on file. Not reviewed this visit You Were Diagnosed With   
  
 Codes Comments Carotid stenosis, bilateral    -  Primary ICD-10-CM: L52.92 ICD-9-CM: 433.10, 433.30 Ascending aortic dissection (HCC)     ICD-10-CM: I71.01 
ICD-9-CM: 441.01 Hypercholesterolemia     ICD-10-CM: E78.00 ICD-9-CM: 272.0 Vitals BP Pulse Resp Height(growth percentile) Weight(growth percentile) SpO2  
 126/68 (BP 1 Location: Left arm, BP Patient Position: Sitting) 63 16 5' 1\" (1.549 m) 187 lb 6.4 oz (85 kg) 99% BMI OB Status Smoking Status 35.41 kg/m2 Hysterectomy Never Smoker Vitals History BMI and BSA Data Body Mass Index Body Surface Area  
 35.41 kg/m 2 1.91 m 2 Preferred Pharmacy Pharmacy Name Phone [id number]- [address] AT [address] [phone] Your Updated Medication List  
  
   
This list is accurate as of: 12/12/17  4:18 PM.  Always use your most recent med list.  
  
  
  
  
 aspirin delayed-release 81 mg tablet Take  by mouth daily. b complex vitamins tablet Take 1 Tab by mouth daily. bromelains 500 mg Tab Take  by mouth. CO Q-10 100 mg capsule Generic drug:  co-enzyme Q-10 Take 100 mg by mouth daily. CYMBALTA 60 mg capsule Generic drug:  DULoxetine Take 60 mg by mouth daily. gabapentin 100 mg capsule Commonly known as:  NEURONTIN  
200 mg daily. ibuprofen 200 mg tablet Commonly known as:  MOTRIN Take  by mouth daily as needed for Pain. lisinopril 5 mg tablet Commonly known as:  Diya Mason Take 1 Tab by mouth daily. metoprolol tartrate 50 mg tablet Commonly known as:  LOPRESSOR Take 1 Tab by mouth two (2) times a day. multivitamin tablet Commonly known as:  ONE A DAY Take 1 Tab by mouth daily. omeprazole 40 mg capsule Commonly known as:  PRILOSEC Take 40 mg by mouth Daily (before breakfast). pravastatin 10 mg tablet Commonly known as:  PRAVACHOL Take 10 mg by mouth nightly. VITAMIN C 500 mg tablet Generic drug:  ascorbic acid (vitamin C) Take  by mouth. VITAMIN D3 1,000 unit Cap Generic drug:  cholecalciferol Take  by mouth. Follow-up Instructions Return in about 6 months (around 6/12/2018). Introducing [facility]! Dear Delaney Lee: Thank you for requesting a [employer] account. Our records indicate that you already have an active [employer] account. You can access your account anytime at [email or web address] Did you know that you can access your hospital and ER discharge instructions at any time in [employer]?   You can also review all of your test results from your hospital stay or ER visit. Additional Information If you have questions, please visit the Frequently Asked Questions section of the [employer] website at [email or web address] Remember, [employer] is NOT to be used for urgent needs. For medical emergencies, dial 911. Now available from your iPhone and Android! Please provide this summary of care documentation to your next provider. Your primary care clinician is listed as Maris Moran. If you have any questions after today's visit, please call [phone].

## 2017-12-12 NOTE — PROGRESS NOTES
Mukesh David is a 62 y.o. female  Chief Complaint   Patient presents with    Valvular Heart Disease    Cholesterol Problem     1. Have you been to the ER, urgent care clinic since your last visit? Hospitalized since your last visit? No    2. Have you seen or consulted any other health care providers outside of the [address] [place] [address] since your last visit? Include any pap smears or colon screening.   Dr. Josh Turner, PCP, 1 week ago for routine exam.     Visit Vitals    /68 (BP 1 Location: Left arm, BP Patient Position: Sitting)    Pulse 63    Resp 16    Ht 5' 1\" (1.549 m)    Wt 187 lb 6.4 oz (85 kg)    SpO2 99%    BMI 35.41 kg/m2

## 2017-12-12 NOTE — PROGRESS NOTES
LAST OFFICE VISIT : 8/2/2017        ICD-10-CM ICD-9-CM   1. Carotid stenosis, bilateral I65.23 433.10     433.30   2. Ascending aortic dissection (HCC) I71.01 441.01   3. Hypercholesterolemia E78.00 272.0            Wilson Proctor is a 62 y.o. female with dyslipidemia referred for 6 month follow up. Cardiac risk factors: family history, dyslipidemia, obesity, sedentary life style  I have personally obtained the history from the patient. HISTORY OF PRESENTING ILLNESS     Overall the pt states she is doing well. She has lost weight since her last visit but states that she is not really working on this. The patient denies chest pain/ shortness of breath, orthopnea, PND, LE edema, palpitations, syncope, presyncope or fatigue. ACTIVE PROBLEM LIST     Patient Active Problem List    Diagnosis Date Noted    Aortic stenosis 04/29/2015    Postoperative anemia due to acute blood loss 04/27/2015    Ascending aortic dissection (HCC) 04/25/2015    Aortic dissection (HCC) 04/24/2015    Chest pain 04/23/2015    Hypercholesterolemia     Classical migraine     Anxiety and depression            PAST MEDICAL HISTORY     Past Medical History:   Diagnosis Date    Acquired absence of both cervix and uterus     Anxiety and depression     Ascending aortic dissection ([facility] [address] 4/25/2015    Re suspension of aortic valve and 26 mm Hemashield tube graft; circulatory arrest    Classical migraine     Fibroids     Hypercholesterolemia            PAST SURGICAL HISTORY     Past Surgical History:   Procedure Laterality Date    HX MASTOPEXY (BREAST LIFT)  09/2006    HX TONSILLECTOMY      HX TOTAL VAGINAL HYSTERECTOMY  2003    NE EVASC RPR AAA PSEUDOARYSM ABDL AORTA VISC RS&I  04/25/15    Aortic Valve Repair          ALLERGIES     Allergies   Allergen Reactions    Contrast Agent [Iodine] Other (comments)     Happened as a child but was sent to hospital for treatment.            FAMILY HISTORY     Family History Problem Relation Age of Onset    Other Father      Aneurysm    Heart Disease Father     Other Sister      Aneurysm    Heart Disease Mother     Heart Disease Sister     Heart Disease Maternal Grandfather     Heart Attack Maternal Grandfather     Heart Attack Sister     negative for cardiac disease       SOCIAL HISTORY     Social History     Social History    Marital status:      Spouse name: N/A    Number of children: N/A    Years of education: N/A     Social History Main Topics    Smoking status: Never Smoker    Smokeless tobacco: Never Used    Alcohol use No    Drug use: No    Sexual activity: Yes     Partners: Male     Birth control/ protection: Surgical      Comment: Hysterectomy     Other Topics Concern    None     Social History Narrative         MEDICATIONS     Current Outpatient Prescriptions   Medication Sig    metoprolol tartrate (LOPRESSOR) 50 mg tablet Take 1 Tab by mouth two (2) times a day.  lisinopril (PRINIVIL, ZESTRIL) 5 mg tablet Take 1 Tab by mouth daily.  DULoxetine (CYMBALTA) 60 mg capsule Take 60 mg by mouth daily.  aspirin delayed-release 81 mg tablet Take  by mouth daily.  co-enzyme Q-10 (CO Q-10) 100 mg capsule Take 100 mg by mouth daily.  ascorbic acid (VITAMIN C) 500 mg tablet Take  by mouth.  Cholecalciferol, Vitamin D3, (VITAMIN D3) 1,000 unit cap Take  by mouth.  multivitamin (ONE A DAY) tablet Take 1 Tab by mouth daily.  bromelains 500 mg tab Take  by mouth.  ibuprofen (MOTRIN) 200 mg tablet Take  by mouth daily as needed for Pain.  gabapentin (NEURONTIN) 100 mg capsule 200 mg daily.  omeprazole (PRILOSEC) 40 mg capsule Take 40 mg by mouth Daily (before breakfast).  pravastatin (PRAVACHOL) 10 mg tablet Take 10 mg by mouth nightly.  b complex vitamins tablet Take 1 Tab by mouth daily. No current facility-administered medications for this visit.         I have reviewed the nurses notes, vitals, problem list, allergy list, medical history, family, social history and medications. REVIEW OF SYMPTOMS      General: Pt denies excessive weight gain or loss. Pt is able to conduct ADL's  HEENT: Denies blurred vision, headaches, hearing loss, epistaxis and difficulty swallowing. Respiratory: Denies cough, congestion, shortness of breath, CUELLO, wheezing or stridor. Cardiovascular: Denies precordial pain, palpitations, edema or PND  Gastrointestinal: Denies poor appetite, indigestion, abdominal pain or blood in stool  Genitourinary: Denies hematuria, dysuria, increased urinary frequency  Musculoskeletal: Denies joint pain or swelling from muscles or joints  Neurologic: Denies tremor, paresthesias, headache, or sensory motor disturbance  Psychiatric: Denies confusion, insomnia, depression  Integumentray: Denies rash, itching or ulcers. Hematologic: Denies easy bruising, bleeding     PHYSICAL EXAMINATION      Vitals:    12/12/17 1558   BP: 126/68   Pulse: 63   Resp: 16   SpO2: 99%   Weight: 187 lb 6.4 oz (85 kg)   Height: 5' 1\" (1.549 m)     General: Well developed, in no acute distress. HEENT: No jaundice, oral mucosa moist, no oral ulcers  Neck: Supple, no stiffness, no lymphadenopathy, supple  Heart: 2/6 systolic murmur at the RUSB  Respiratory: Clear bilaterally x 4, no wheezing or rales  Extremities:  No edema, normal cap refill, no cyanosis. Musculoskeletal: No clubbing, no deformities  Neuro: A&Ox3, speech clear, gait stable, cooperative, no focal neurologic deficits  Skin: Skin color is normal. No rashes or lesions. Non diaphoretic, moist.       DIAGNOSTIC DATA     1. Echocardiogram   5/22/15 - 45-55% LAE, mild to moderate MR  5/26/16- EF 60%, MR/TR/NY mild    2. Cholesterol profile   4/24/15 - , HDL 45, ,   7/24/15 - , HDL 34, LDL 69,   1/26/16 - , HDL 47, ,   3/12/17- , HDL 42,   12/1/17- , HDL 41, , Tg 211    3.  Carotid duplex/Vascular studies: 4/24/15 - Right 0-49%, Left 0-49%  8/2/17- L/R 0-9%    4. Cardiac MRI  8/4/15   1. Patient is status post interposition tube graft for ascending aortic   dissection. The ascending aorta tube graft extends from the sinotubular   junction to the aortic arch. In the mid section of the tube graft there is a   kink without limitation of the flow. Proximally the ascending aortic tube   graft measures 27 mm. In the mid section where theere is a kink the tube   graft measures 33 mm and distally it measures 25 mm prior to termination in   the aortic arch. There is no perigraft collection. Normal sinus of Valsalva. The aortic arch and proximal descending thoracic aorta is mildly dilated at   35 mm. 2. There is residual dissection extending from the level of the left common   carotid artery and the aortic arch down to the descending thoracic aorta and   abdominal aorta aorta and extending into the left common iliac and   terminating in the proximal portion of the left external iliac artery. The   right common iliac and the left internal iliac artery are free of   dissection. As expected the false lumen is larger than the true lumen. The   celiac trunk and SMA takeoff is from the true lumen. Both renal arteries   appeared to originate from the false lumen. There is no aneurysm of the   descending thoracic aorta or abdominal aorta. 3. Normal trileaflet aortic valve. No significant aortic regurgitation. 4. Mild 1+ mitral regurgitation. 5. Mild 1+ tricuspid regurgitation. 6. Normal left ventricular size and systolic function. LVEF 60%. 7. Normal right ventricular size and systolic function. RVEF 55%. 8. Normal pleura and pericardium. There is no significant effusions. 9. No intravenous contrast was used for the study. 9/4/15  1. Postoperative changes ascending aorta. Small kink within the graft   unchanged  2. Caliber of the descending thoracic aorta and abdominal aorta unchanged. 3. No paracardial effusion  4. Slow the flow versus partial thrombosis of the proximal descending   thoracic aorta false lumen    3/8/16  1. No interval change in postoperative findings from a descending aorta   dissection repair and associated appearance and size of the ace ascending   and descending aorta. 2. No change in the appearance of the residual descending aortic dissection   with true and false lumens. LABORATORY DATA            Lab Results   Component Value Date/Time    WBC 11.1 09/04/2015 12:20 PM    Hemoglobin (POC) 13.3 09/04/2015 12:32 PM    HGB 11.8 09/04/2015 12:20 PM    Hematocrit (POC) 39 09/04/2015 12:32 PM    HCT 37.8 09/04/2015 12:20 PM    PLATELET 427 06/79/1405 12:20 PM    MCV 79.4 09/04/2015 12:20 PM      Lab Results   Component Value Date/Time    Sodium 137 05/06/2015 04:57 AM    Potassium 3.6 05/06/2015 04:57 AM    Chloride 99 05/06/2015 04:57 AM    CO2 31 05/06/2015 04:57 AM    Anion gap 7 05/06/2015 04:57 AM    Glucose 109 05/06/2015 04:57 AM    BUN 14 05/06/2015 04:57 AM    Creatinine 0.58 05/06/2015 04:57 AM    BUN/Creatinine ratio 24 05/06/2015 04:57 AM    GFR est AA >60 05/06/2015 04:57 AM    GFR est non-AA >60 05/06/2015 04:57 AM    Calcium 8.7 05/06/2015 04:57 AM    Bilirubin, total 0.4 09/04/2015 12:20 PM    AST (SGOT) 19 09/04/2015 12:20 PM    Alk. phosphatase 157 09/04/2015 12:20 PM    Protein, total 8.0 09/04/2015 12:20 PM    Albumin 4.0 09/04/2015 12:20 PM    Globulin 4.0 09/04/2015 12:20 PM    A-G Ratio 1.0 09/04/2015 12:20 PM    ALT (SGPT) 23 09/04/2015 12:20 PM           ASSESSMENT/RECOMMENDATIONS:.      1. Aortic aneurysm repair  - She has been doing well her last cardiac MRI in April of 2017 showed no changes in the dissection , it does proceed down into her abdominal aorta with a true and a false lumen, this is unchanged from previous MRIs. 2. Dyslipidemia  - LDL is still slightly elevated, I encouraged her to work on diet and exercise. I will recheck this again in 6 months.   3. Family history of carotid disease  4. Return in 6 months or PRN. No orders of the defined types were placed in this encounter. Follow-up Disposition:  Return in about 6 months (around 6/12/2018). I have discussed the diagnosis with  Sandeep Luke and the intended plan as seen in the above orders. Questions were answered concerning future plans. I have discussed medication side effects and warnings with the patient as well. Thank you,  Romana Skinner MD for involving me in the care of  Sandeep Luke. Please do not hesitate to contact me for further questions/concerns. Written by Syed Fong, as dictated by Mary Hernandez MD.     Lindy Mari MD, [address] [place] [address] Rd[address]      [facility], [address] [place] [address] [facility]      [phone] / [phone] Fax

## 2017-12-20 ENCOUNTER — OFFICE VISIT (OUTPATIENT)
Dept: OBGYN CLINIC | Age: 57
End: 2017-12-20

## 2017-12-20 DIAGNOSIS — Z12.31 ENCOUNTER FOR SCREENING MAMMOGRAM FOR MALIGNANT NEOPLASM OF BREAST: Primary | ICD-10-CM

## 2018-01-30 RX ORDER — LISINOPRIL 5 MG/1
5 TABLET ORAL DAILY
Qty: 30 TAB | Refills: 6 | Status: SHIPPED | OUTPATIENT
Start: 2018-01-30 | End: 2018-01-30 | Stop reason: SDUPTHER

## 2018-01-30 RX ORDER — LISINOPRIL 5 MG/1
5 TABLET ORAL DAILY
Qty: 30 TAB | Refills: 6 | Status: SHIPPED | OUTPATIENT
Start: 2018-01-30 | End: 2018-10-17 | Stop reason: SDUPTHER

## 2018-04-24 RX ORDER — METOPROLOL TARTRATE 50 MG/1
50 TABLET ORAL 2 TIMES DAILY
Qty: 60 TAB | Refills: 2 | Status: SHIPPED | OUTPATIENT
Start: 2018-04-24 | End: 2018-07-12 | Stop reason: SDUPTHER

## 2018-05-07 ENCOUNTER — TELEPHONE (OUTPATIENT)
Dept: CARDIOLOGY CLINIC | Age: 58
End: 2018-05-07

## 2018-05-07 NOTE — TELEPHONE ENCOUNTER
Pt wants to have her MRI Cardiac flow done at [facility] but they don't have anything available until 6/6 and pt has her follow up appointment on 6/7 and she wanted to know what Dr. Vickie Meyer wanted her to do. Negar Bautista can be reached @ [phone]. Thanks!

## 2018-06-06 ENCOUNTER — OFFICE VISIT (OUTPATIENT)
Dept: CARDIOLOGY CLINIC | Age: 58
End: 2018-06-06

## 2018-06-06 VITALS
HEIGHT: 61 IN | BODY MASS INDEX: 32.66 KG/M2 | SYSTOLIC BLOOD PRESSURE: 114 MMHG | DIASTOLIC BLOOD PRESSURE: 72 MMHG | HEART RATE: 68 BPM | RESPIRATION RATE: 16 BRPM | WEIGHT: 173 LBS | OXYGEN SATURATION: 97 %

## 2018-06-06 DIAGNOSIS — Z00.00 ANNUAL PHYSICAL EXAM: ICD-10-CM

## 2018-06-06 DIAGNOSIS — I71.010 ASCENDING AORTIC DISSECTION: Primary | ICD-10-CM

## 2018-06-06 NOTE — PROGRESS NOTES
LAST OFFICE VISIT : 8/2/2017        ICD-10-CM ICD-9-CM   1. Annual physical exam Z00.00 V70.0            Sandra Carreon is a 62 y.o. female with dyslipidemia aortic aneurysm repair. Had a history referred for 6 month follow up. Cardiac risk factors: family history, dyslipidemia, obesity, sedentary life style  I have personally obtained the history from the patient. HISTORY OF PRESENTING ILLNESS     She is doing well with no cardiac issues is scheduled for cardiac MRI next week no chest pain intermittent twinges on the left side but nothing significant. She has lost weight and is working on exercising and diet. ACTIVE PROBLEM LIST     Patient Active Problem List    Diagnosis Date Noted    Aortic stenosis 04/29/2015    Postoperative anemia due to acute blood loss 04/27/2015    Ascending aortic dissection (HCC) 04/25/2015    Aortic dissection (HCC) 04/24/2015    Chest pain 04/23/2015    Hypercholesterolemia     Classical migraine     Anxiety and depression            PAST MEDICAL HISTORY     Past Medical History:   Diagnosis Date    Acquired absence of both cervix and uterus     Anxiety and depression     Ascending aortic dissection ([address] 4/25/2015    Re suspension of aortic valve and 26 mm Hemashield tube graft; circulatory arrest    Classical migraine     Fibroids     Hypercholesterolemia            PAST SURGICAL HISTORY     Past Surgical History:   Procedure Laterality Date    HX MASTOPEXY (BREAST LIFT)  09/2006    HX TONSILLECTOMY      HX TOTAL VAGINAL HYSTERECTOMY  2003    [place] EVASC RPR AAA PSEUDOARYSM ABDL AORTA VISC RS&I  04/25/15    Aortic Valve Repair          ALLERGIES     Allergies   Allergen Reactions    Contrast Agent [Iodine] Other (comments)     Happened as a child but was sent to hospital for treatment.            FAMILY HISTORY     Family History   Problem Relation Age of Onset    Other Father      Aneurysm    Heart Disease Father     Other Sister      Aneurysm    Heart Disease Mother     Heart Disease Sister     Heart Disease Maternal Grandfather     Heart Attack Maternal Grandfather     Heart Attack Sister     negative for cardiac disease       SOCIAL HISTORY     Social History     Social History    Marital status:      Spouse name: N/A    Number of children: N/A    Years of education: N/A     Social History Main Topics    Smoking status: Never Smoker    Smokeless tobacco: Never Used    Alcohol use No    Drug use: No    Sexual activity: Yes     Partners: Male     Birth control/ protection: Surgical      Comment: Hysterectomy     Other Topics Concern    None     Social History Narrative         MEDICATIONS     Current Outpatient Prescriptions   Medication Sig    metoprolol tartrate (LOPRESSOR) 50 mg tablet Take 1 Tab by mouth two (2) times a day.  lisinopril (PRINIVIL, ZESTRIL) 5 mg tablet Take 1 Tab by mouth daily.  DULoxetine (CYMBALTA) 60 mg capsule Take 60 mg by mouth daily.  aspirin delayed-release 81 mg tablet Take  by mouth daily.  co-enzyme Q-10 (CO Q-10) 100 mg capsule Take 100 mg by mouth daily.  ascorbic acid (VITAMIN C) 500 mg tablet Take  by mouth.  b complex vitamins tablet Take 1 Tab by mouth daily.  Cholecalciferol, Vitamin D3, (VITAMIN D3) 1,000 unit cap Take  by mouth.  multivitamin (ONE A DAY) tablet Take 1 Tab by mouth daily.  ibuprofen (MOTRIN) 200 mg tablet Take  by mouth daily as needed for Pain.  gabapentin (NEURONTIN) 100 mg capsule 200 mg daily.  omeprazole (PRILOSEC) 40 mg capsule Take 40 mg by mouth Daily (before breakfast).  pravastatin (PRAVACHOL) 10 mg tablet Take 10 mg by mouth nightly. No current facility-administered medications for this visit. I have reviewed the nurses notes, vitals, problem list, allergy list, medical history, family, social history and medications. REVIEW OF SYMPTOMS      General: Pt denies excessive weight gain or loss.  Pt is able to conduct ADL's  HEENT: Denies blurred vision, headaches, hearing loss, epistaxis and difficulty swallowing. Respiratory: Denies cough, congestion, shortness of breath, CUELLO, wheezing or stridor. Cardiovascular: Denies precordial pain, palpitations, edema or PND  Gastrointestinal: Denies poor appetite, indigestion, abdominal pain or blood in stool  Genitourinary: Denies hematuria, dysuria, increased urinary frequency  Musculoskeletal: Denies joint pain or swelling from muscles or joints  Neurologic: Denies tremor, paresthesias, headache, or sensory motor disturbance  Psychiatric: Denies confusion, insomnia, depression  Integumentray: Denies rash, itching or ulcers. Hematologic: Denies easy bruising, bleeding     PHYSICAL EXAMINATION      Vitals:    06/06/18 0840   BP: 114/72   Pulse: 68   Resp: 16   SpO2: 97%   Weight: 173 lb (78.5 kg)   Height: 5' 1\" (1.549 m)     General: Well developed, in no acute distress. HEENT: No jaundice, oral mucosa moist, no oral ulcers  Neck: Supple, no stiffness, no lymphadenopathy, supple  Heart: 2/6 systolic murmur at the RUSB unchanged  Respiratory: Clear bilaterally x 4, no wheezing or rales  Extremities:  No edema, normal cap refill, no cyanosis. Musculoskeletal: No clubbing, no deformities  Neuro: A&Ox3, speech clear, gait stable, cooperative, no focal neurologic deficits  Skin: Skin color is normal. No rashes or lesions. Non diaphoretic, moist.       DIAGNOSTIC DATA     1. Echocardiogram   5/22/15 - 45-55% LAE, mild to moderate MR  5/26/16- EF 60%, MR/TR/DE mild    2. Cholesterol profile   4/24/15 - , HDL 45, ,   7/24/15 - , HDL 34, LDL 69,   1/26/16 - , HDL 47, ,   3/12/17- , HDL 42,   12/1/17- , HDL 41, , Tg 211    3. Carotid duplex/Vascular studies:   4/24/15 - Right 0-49%, Left 0-49%  8/2/17- L/R 0-9%    4. Cardiac MRI  8/4/15   1.  Patient is status post interposition tube graft for ascending aortic dissection. The ascending aorta tube graft extends from the sinotubular   junction to the aortic arch. In the mid section of the tube graft there is a   kink without limitation of the flow. Proximally the ascending aortic tube   graft measures 27 mm. In the mid section where theere is a kink the tube   graft measures 33 mm and distally it measures 25 mm prior to termination in   the aortic arch. There is no perigraft collection. Normal sinus of Valsalva. The aortic arch and proximal descending thoracic aorta is mildly dilated at   35 mm. 2. There is residual dissection extending from the level of the left common   carotid artery and the aortic arch down to the descending thoracic aorta and   abdominal aorta aorta and extending into the left common iliac and   terminating in the proximal portion of the left external iliac artery. The   right common iliac and the left internal iliac artery are free of   dissection. As expected the false lumen is larger than the true lumen. The   celiac trunk and SMA takeoff is from the true lumen. Both renal arteries   appeared to originate from the false lumen. There is no aneurysm of the   descending thoracic aorta or abdominal aorta. 3. Normal trileaflet aortic valve. No significant aortic regurgitation. 4. Mild 1+ mitral regurgitation. 5. Mild 1+ tricuspid regurgitation. 6. Normal left ventricular size and systolic function. LVEF 60%. 7. Normal right ventricular size and systolic function. RVEF 55%. 8. Normal pleura and pericardium. There is no significant effusions. 9. No intravenous contrast was used for the study. 9/4/15  1. Postoperative changes ascending aorta. Small kink within the graft   unchanged  2. Caliber of the descending thoracic aorta and abdominal aorta unchanged. 3. No paracardial effusion  4. Slow the flow versus partial thrombosis of the proximal descending   thoracic aorta false lumen    3/8/16  1.  No interval change in postoperative findings from a descending aorta   dissection repair and associated appearance and size of the ace ascending   and descending aorta. 2. No change in the appearance of the residual descending aortic dissection   with true and false lumens. LABORATORY DATA            Lab Results   Component Value Date/Time    WBC 11.1 (H) 09/04/2015 12:20 PM    Hemoglobin (POC) 13.3 09/04/2015 12:32 PM    HGB 11.8 09/04/2015 12:20 PM    Hematocrit (POC) 39 09/04/2015 12:32 PM    HCT 37.8 09/04/2015 12:20 PM    PLATELET 022 79/95/1755 12:20 PM    MCV 79.4 (L) 09/04/2015 12:20 PM      Lab Results   Component Value Date/Time    Sodium 137 05/06/2015 04:57 AM    Potassium 3.6 05/06/2015 04:57 AM    Chloride 99 05/06/2015 04:57 AM    CO2 31 05/06/2015 04:57 AM    Anion gap 7 05/06/2015 04:57 AM    Glucose 109 (H) 05/06/2015 04:57 AM    BUN 14 05/06/2015 04:57 AM    Creatinine 0.58 05/06/2015 04:57 AM    BUN/Creatinine ratio 24 (H) 05/06/2015 04:57 AM    GFR est AA >60 05/06/2015 04:57 AM    GFR est non-AA >60 05/06/2015 04:57 AM    Calcium 8.7 05/06/2015 04:57 AM    Bilirubin, total 0.4 09/04/2015 12:20 PM    AST (SGOT) 19 09/04/2015 12:20 PM    Alk. phosphatase 157 (H) 09/04/2015 12:20 PM    Protein, total 8.0 09/04/2015 12:20 PM    Albumin 4.0 09/04/2015 12:20 PM    Globulin 4.0 09/04/2015 12:20 PM    A-G Ratio 1.0 (L) 09/04/2015 12:20 PM    ALT (SGPT) 23 09/04/2015 12:20 PM           ASSESSMENT/RECOMMENDATIONS:.      1. Aortic aneurysm repair  -She has been doing well chest discomfort. She has occasional sensations on the left side unrelated to activity.    -Her EKG today demonstrates T-wave inversions laterally not seen on previous EKGs. I would not pursue this at this time. She is scheduled for cardiac MRI next week. Her cholesterols been followed by her primary care LDL goal is close to 100 she has lost some weight in his diet and exercise. I encouraged her to continue to do so.   2. Dyslipidemia  -Cholesterol goal is LDL cholesterol 100. weight cholesterol profile being followed by her primary care is close to goal.  She is working on exercise and has lost  .  3. Family history of carotid disease      4. Return in 6 months or PRN. Orders Placed This Encounter    AMB POC EKG ROUTINE W/ 12 LEADS, INTER & REP     Order Specific Question:   Reason for Exam:     Answer: Annual        Follow-up Disposition: Not on File      I have discussed the diagnosis with  Cristiana Garcia and the intended plan as seen in the above orders. Questions were answered concerning future plans. I have discussed medication side effects and warnings with the patient as well. Thank you,  Sanam Orozco MD for involving me in the care of  Cristiana Garcia. Please do not hesitate to contact me for further questions/concerns. Written by Davis Abbott, as dictated by Yvette Dinh MD.     Adelina Snellen Doloresco, MD, [address] [place] [address] Rd., [address]      [facility], [address] [place] [address]      [phone] / [phone] Fax

## 2018-06-06 NOTE — PROGRESS NOTES
Chief Complaint   Patient presents with    Annual Exam    Carotid Artery Stenosis    Other     Ascending Aortic disection      Visit Vitals    /72 (BP 1 Location: Left arm, BP Patient Position: Sitting)    Pulse 68    Resp 16    Ht 5' 1\" (1.549 m)    Wt 173 lb (78.5 kg)    SpO2 97%    BMI 32.69 kg/m2     Pt presents in office with questions about medications. She would like to know if she can take metoprolol succinate XL instead of tartrate? Can she take Claritin D? Ibuprofen? No complaints.      VO to d/c medications pt reports as \"not taking\" per Dr. Maddi Balbuena.

## 2018-06-06 NOTE — PROGRESS NOTES
LAST OFFICE VISIT : 12/12/2017        ICD-10-CM ICD-9-CM   1. Annual physical exam Z00.00 V70.0            Priya Diaz is a 62 y.o. female with dyslipidemia referred for 6 month follow up. Cardiac risk factors: family history, dyslipidemia, obesity, post-menopausal  I have personally obtained the history from the patient. HISTORY OF PRESENTING ILLNESS     ***     The patient denies chest pain/ shortness of breath, orthopnea, PND, LE edema, palpitations, syncope, presyncope or fatigue. ACTIVE PROBLEM LIST     Patient Active Problem List    Diagnosis Date Noted    Aortic stenosis 04/29/2015    Postoperative anemia due to acute blood loss 04/27/2015    Ascending aortic dissection (HCC) 04/25/2015    Aortic dissection (HCC) 04/24/2015    Chest pain 04/23/2015    Hypercholesterolemia     Classical migraine     Anxiety and depression            PAST MEDICAL HISTORY     Past Medical History:   Diagnosis Date    Acquired absence of both cervix and uterus     Anxiety and depression     Ascending aortic dissection ([address] 4/25/2015    Re suspension of aortic valve and 26 mm Hemashield tube graft; circulatory arrest    Classical migraine     Fibroids     Hypercholesterolemia            PAST SURGICAL HISTORY     Past Surgical History:   Procedure Laterality Date    HX MASTOPEXY (BREAST LIFT)  09/2006    HX TONSILLECTOMY      HX TOTAL VAGINAL HYSTERECTOMY  2003    IN EVASC RPR AAA PSEUDOARYSM ABDL AORTA VISC RS&I  04/25/15    Aortic Valve Repair          ALLERGIES     Allergies   Allergen Reactions    Contrast Agent [Iodine] Other (comments)     Happened as a child but was sent to hospital for treatment.            FAMILY HISTORY     Family History   Problem Relation Age of Onset    Other Father      Aneurysm    Heart Disease Father     Other Sister      Aneurysm    Heart Disease Mother     Heart Disease Sister     Heart Disease Maternal Grandfather     Heart Attack Maternal Grandfather     Heart Attack Sister     negative for cardiac disease       SOCIAL HISTORY     Social History     Social History    Marital status:      Spouse name: N/A    Number of children: N/A    Years of education: N/A     Social History Main Topics    Smoking status: Never Smoker    Smokeless tobacco: Never Used    Alcohol use No    Drug use: No    Sexual activity: Yes     Partners: Male     Birth control/ protection: Surgical      Comment: Hysterectomy     Other Topics Concern    None     Social History Narrative         MEDICATIONS     Current Outpatient Prescriptions   Medication Sig    metoprolol tartrate (LOPRESSOR) 50 mg tablet Take 1 Tab by mouth two (2) times a day.  lisinopril (PRINIVIL, ZESTRIL) 5 mg tablet Take 1 Tab by mouth daily.  DULoxetine (CYMBALTA) 60 mg capsule Take 60 mg by mouth daily.  aspirin delayed-release 81 mg tablet Take  by mouth daily.  co-enzyme Q-10 (CO Q-10) 100 mg capsule Take 100 mg by mouth daily.  ascorbic acid (VITAMIN C) 500 mg tablet Take  by mouth.  b complex vitamins tablet Take 1 Tab by mouth daily.  Cholecalciferol, Vitamin D3, (VITAMIN D3) 1,000 unit cap Take  by mouth.  multivitamin (ONE A DAY) tablet Take 1 Tab by mouth daily.  ibuprofen (MOTRIN) 200 mg tablet Take  by mouth daily as needed for Pain.  gabapentin (NEURONTIN) 100 mg capsule 200 mg daily.  omeprazole (PRILOSEC) 40 mg capsule Take 40 mg by mouth Daily (before breakfast).  pravastatin (PRAVACHOL) 10 mg tablet Take 10 mg by mouth nightly. No current facility-administered medications for this visit. I have reviewed the nurses notes, vitals, problem list, allergy list, medical history, family, social history and medications. REVIEW OF SYMPTOMS      General: Pt denies excessive weight gain or loss. Pt is able to conduct ADL's  HEENT: Denies blurred vision, headaches, hearing loss, epistaxis and difficulty swallowing.   Respiratory: Denies cough, congestion, shortness of breath, CUELLO, wheezing or stridor. Cardiovascular: Denies precordial pain, palpitations, edema or PND  Gastrointestinal: Denies poor appetite, indigestion, abdominal pain or blood in stool  Genitourinary: Denies hematuria, dysuria, increased urinary frequency  Musculoskeletal: Denies joint pain or swelling from muscles or joints  Neurologic: Denies tremor, paresthesias, headache, or sensory motor disturbance  Psychiatric: Denies confusion, insomnia, depression  Integumentray: Denies rash, itching or ulcers. Hematologic: Denies easy bruising, bleeding     PHYSICAL EXAMINATION      Vitals:    06/06/18 0840   BP: 114/72   Pulse: 68   Resp: 16   SpO2: 97%   Weight: 173 lb (78.5 kg)   Height: 5' 1\" (1.549 m)     General: Well developed, in no acute distress. HEENT: No jaundice, oral mucosa moist, no oral ulcers  Neck: Supple, no stiffness, no lymphadenopathy, supple  Heart:  Normal S1/S2 negative S3 or S4. Regular, no murmur, gallop or rub, no jugular venous distention  Respiratory: Clear bilaterally x 4, no wheezing or rales  Abdomen:   Soft, non-tender, bowel sounds are active.   Extremities:  No edema, normal cap refill, no cyanosis. Musculoskeletal: No clubbing, no deformities  Neuro: A&Ox3, speech clear, gait stable, cooperative, no focal neurologic deficits  Skin: Skin color is normal. No rashes or lesions. Non diaphoretic, moist.  Vascular: 2+ pulses symmetric in all extremities        EKG:      DIAGNOSTIC DATA     1. Echocardiogram   5/22/15 - 45-55% LAE, mild to moderate MR  5/26/16- EF 60%, MR/TR/NY mild    2. Cholesterol profile   4/24/15 - , HDL 45, ,   7/24/15 - , HDL 34, LDL 69,   1/26/16 - , HDL 47, ,   3/12/17- , HDL 42,   12/1/17- , HDL 41, , Tg 211    3. Carotid duplex/Vascular studies:   4/24/15 - Right 0-49%, Left 0-49%  8/2/17- L/R 0-9%    4. Cardiac MRI  8/4/15   1.  Patient is status post interposition tube graft for ascending aortic   dissection. The ascending aorta tube graft extends from the sinotubular   junction to the aortic arch. In the mid section of the tube graft there is a   kink without limitation of the flow. Proximally the ascending aortic tube   graft measures 27 mm. In the mid section where theere is a kink the tube   graft measures 33 mm and distally it measures 25 mm prior to termination in   the aortic arch. There is no perigraft collection. Normal sinus of Valsalva. The aortic arch and proximal descending thoracic aorta is mildly dilated at   35 mm. 2. There is residual dissection extending from the level of the left common   carotid artery and the aortic arch down to the descending thoracic aorta and   abdominal aorta aorta and extending into the left common iliac and   terminating in the proximal portion of the left external iliac artery. The   right common iliac and the left internal iliac artery are free of   dissection. As expected the false lumen is larger than the true lumen. The   celiac trunk and SMA takeoff is from the true lumen. Both renal arteries   appeared to originate from the false lumen. There is no aneurysm of the   descending thoracic aorta or abdominal aorta. 3. Normal trileaflet aortic valve. No significant aortic regurgitation. 4. Mild 1+ mitral regurgitation. 5. Mild 1+ tricuspid regurgitation. 6. Normal left ventricular size and systolic function. LVEF 60%. 7. Normal right ventricular size and systolic function. RVEF 55%. 8. Normal pleura and pericardium. There is no significant effusions. 9. No intravenous contrast was used for the study. 9/4/15  1. Postoperative changes ascending aorta. Small kink within the graft   unchanged  2. Caliber of the descending thoracic aorta and abdominal aorta unchanged. 3. No paracardial effusion  4. Slow the flow versus partial thrombosis of the proximal descending   thoracic aorta false lumen    3/8/16  1. No interval change in postoperative findings from a descending aorta   dissection repair and associated appearance and size of the ace ascending   and descending aorta. 2. No change in the appearance of the residual descending aortic dissection   with true and false lumens. LABORATORY DATA            Lab Results   Component Value Date/Time    WBC 11.1 (H) 09/04/2015 12:20 PM    Hemoglobin (POC) 13.3 09/04/2015 12:32 PM    HGB 11.8 09/04/2015 12:20 PM    Hematocrit (POC) 39 09/04/2015 12:32 PM    HCT 37.8 09/04/2015 12:20 PM    PLATELET 587 81/76/5748 12:20 PM    MCV 79.4 (L) 09/04/2015 12:20 PM      Lab Results   Component Value Date/Time    Sodium 137 05/06/2015 04:57 AM    Potassium 3.6 05/06/2015 04:57 AM    Chloride 99 05/06/2015 04:57 AM    CO2 31 05/06/2015 04:57 AM    Anion gap 7 05/06/2015 04:57 AM    Glucose 109 (H) 05/06/2015 04:57 AM    BUN 14 05/06/2015 04:57 AM    Creatinine 0.58 05/06/2015 04:57 AM    BUN/Creatinine ratio 24 (H) 05/06/2015 04:57 AM    GFR est AA >60 05/06/2015 04:57 AM    GFR est non-AA >60 05/06/2015 04:57 AM    Calcium 8.7 05/06/2015 04:57 AM    Bilirubin, total 0.4 09/04/2015 12:20 PM    AST (SGOT) 19 09/04/2015 12:20 PM    Alk. phosphatase 157 (H) 09/04/2015 12:20 PM    Protein, total 8.0 09/04/2015 12:20 PM    Albumin 4.0 09/04/2015 12:20 PM    Globulin 4.0 09/04/2015 12:20 PM    A-G Ratio 1.0 (L) 09/04/2015 12:20 PM    ALT (SGPT) 23 09/04/2015 12:20 PM           ASSESSMENT/RECOMMENDATIONS:.      1. Aortic aneurysm repair  2. Dyslipidemia  3. Family history of carotid disease  4. Return in 6 months or PRN. Orders Placed This Encounter    AMB POC EKG ROUTINE W/ 12 LEADS, INTER & REP     Order Specific Question:   Reason for Exam:     Answer: Annual        Follow-up Disposition: Not on File      I have discussed the diagnosis with  Shara Gallardo and the intended plan as seen in the above orders. Questions were answered concerning future plans.   I have discussed medication side effects and warnings with the patient as well. Thank you,  Gill Linn MD for involving me in the care of  Susana Dejesus. Please do not hesitate to contact me for further questions/concerns. Written by Milana Cee, as dictated by Bryan Wilcox MD.     Brittany Napier MD, [facility] [place]    Patient Care Team:  Ashtyn Gordon MD as PCP - General (Family Practice)  Rhonda Winters MD as Physician (Obstetrics & Gynecology)  Bryan Wilcox MD (Cardiology)    [address]      [phone] / [phone] Fax

## 2018-06-06 NOTE — MR AVS SNAPSHOT
[address] [facility] [address] 
[phone] Patient: Shalonda Celaya MRN: [id number] Visit Information Date & Time Provider Department Dept. Phone Encounter #  
 6/6/2018  8:40 AM Michelle Lee MD [facility] [phone] [id number] Your Appointments 12/13/2018  8:40 AM  
ESTABLISHED PATIENT with Michelle Lee MD  
[facility] (JIM SCHEDULING) Appt Note: 6 month follow up per Dr. Supa Zhao [facility] [address]  
[address] [place] [address] Upcoming Health Maintenance Date Due Hepatitis C Screening 1960 DTaP/Tdap/Td series (1 - Tdap) 7/12/1981 FOBT Q 1 YEAR AGE 50-75 7/12/2010 PAP AKA CERVICAL CYTOLOGY 11/13/2012 Influenza Age 5 to Adult 8/1/2018 BREAST CANCER SCRN MAMMOGRAM 12/20/2019 Allergies as of 6/6/2018  Review Complete On: 6/6/2018 By: Michelle Lee MD  
  
 Severity Noted Reaction Type Reactions Contrast Agent [Iodine] High 04/23/2015   Not Verified Other (comments) Happened as a child but was sent to hospital for treatment. Current Immunizations  Reviewed on 5/3/2015 No immunizations on file. Not reviewed this visit You Were Diagnosed With   
  
 Codes Comments Annual physical exam    -  Primary ICD-10-CM: Z00.00 ICD-9-CM: V70.0 Vitals BP Pulse Resp Height(growth percentile) Weight(growth percentile) SpO2  
 114/72 (BP 1 Location: Left arm, BP Patient Position: Sitting) 68 16 5' 1\" (1.549 m) 173 lb (78.5 kg) 97% BMI OB Status Smoking Status 32.69 kg/m2 Hysterectomy Never Smoker BMI and BSA Data Body Mass Index Body Surface Area  
 32.69 kg/m 2 1.84 m 2 Preferred Pharmacy Pharmacy Name Phone [facility] #[id number]- [address] AT [facility] [phone] Your Updated Medication List  
  
   
This list is accurate as of 6/6/18  9:17 AM.  Always use your most recent med list.  
  
  
  
  
 aspirin delayed-release 81 mg tablet Take  by mouth daily. b complex vitamins tablet Take 1 Tab by mouth daily. CO Q-10 100 mg capsule Generic drug:  co-enzyme Q-10 Take 100 mg by mouth daily. CYMBALTA 60 mg capsule Generic drug:  DULoxetine Take 60 mg by mouth daily. gabapentin 100 mg capsule Commonly known as:  NEURONTIN  
200 mg daily. ibuprofen 200 mg tablet Commonly known as:  MOTRIN Take  by mouth daily as needed for Pain. lisinopril 5 mg tablet Commonly known as:  Lena Bills Take 1 Tab by mouth daily. metoprolol tartrate 50 mg tablet Commonly known as:  LOPRESSOR Take 1 Tab by mouth two (2) times a day. multivitamin tablet Commonly known as:  ONE A DAY Take 1 Tab by mouth daily. omeprazole 40 mg capsule Commonly known as:  PRILOSEC Take 40 mg by mouth Daily (before breakfast). pravastatin 10 mg tablet Commonly known as:  PRAVACHOL Take 10 mg by mouth nightly. VITAMIN C 500 mg tablet Generic drug:  ascorbic acid (vitamin C) Take  by mouth. VITAMIN D3 1,000 unit Cap Generic drug:  cholecalciferol Take  by mouth. We Performed the Following AMB POC EKG ROUTINE W/ 12 LEADS, INTER & REP [39522 CPT(R)] To-Do List   
 06/13/2018 8:30 AM  
  Appointment with [facility] MRI 1 at [facility] ([phone]) 1. Please bring a list or a bag of your current medications to your appointment 2. Please be NPO (nothing by mouth) after midnight. 3. Please have no caffeine for at least 12 hours prior to testing.  4. Please be sure to remove ALL hair clips, pins, extensions, etc., prior to arriving for your MRI procedure. 5. If you have any medical implants or devices, please bring associated medical card with you. 6. Bring any non [facility] films or CDs pertaining to the area being imaged with you on the day of appointment. 7. A written order with a valid diagnosis and Physicians  signature is required for all scheduled tests. 8. Check in at registration 30min before your appointment time unless you were instructed to do otherwise. Introducing [facility] SERVICES! Dear Loren Loco: Thank you for requesting a [employer] account. Our records indicate that you already have an active Wel[employer] account. You can access your account anytime at [email or web address] Did you know that you can access your hospital and ER discharge instructions at any time in Wel[employer]? You can also review all of your test results from your hospital stay or ER visit. Additional Information If you have questions, please visit the Frequently Asked Questions section of the Wel[employer]oo website at [email or web address] Remember, Wel[employer]oo is NOT to be used for urgent needs. For medical emergencies, dial 911. Now available from your iPhone and Android! Please provide this summary of care documentation to your next provider. Your primary care clinician is listed as José Luis Jones. If you have any questions after today's visit, please call [phone].

## 2018-06-13 ENCOUNTER — HOSPITAL ENCOUNTER (OUTPATIENT)
Dept: MRI IMAGING | Age: 58
Discharge: HOME | End: 2018-06-13
Attending: SPECIALIST
Payer: COMMERCIAL

## 2018-06-13 DIAGNOSIS — I71.010 ASCENDING AORTIC DISSECTION: ICD-10-CM

## 2018-06-13 DIAGNOSIS — E78.00 HYPERCHOLESTEROLEMIA: ICD-10-CM

## 2018-06-13 DIAGNOSIS — I65.23 CAROTID STENOSIS, BILATERAL: ICD-10-CM

## 2018-06-13 PROCEDURE — 75557 CARDIAC MRI FOR MORPH: CPT

## 2018-06-13 PROCEDURE — 75565 CARD MRI VELOC FLOW MAPPING: CPT

## 2018-07-10 ENCOUNTER — HOSPITAL ENCOUNTER (OUTPATIENT)
Dept: CT IMAGING | Age: 58
Discharge: HOME | End: 2018-07-10
Attending: SPECIALIST
Payer: COMMERCIAL

## 2018-07-10 ENCOUNTER — CLINICAL SUPPORT (OUTPATIENT)
Dept: CARDIOLOGY CLINIC | Age: 58
End: 2018-07-10

## 2018-07-10 VITALS — SYSTOLIC BLOOD PRESSURE: 105 MMHG | DIASTOLIC BLOOD PRESSURE: 60 MMHG | HEART RATE: 60 BPM

## 2018-07-10 DIAGNOSIS — R07.89 OTHER CHEST PAIN: Primary | ICD-10-CM

## 2018-07-10 DIAGNOSIS — G80.9 CEREBRAL PALSY, UNSPECIFIED TYPE (HCC): Primary | ICD-10-CM

## 2018-07-10 DIAGNOSIS — I71.019 DISSECTION OF AORTA, THORACIC: Primary | ICD-10-CM

## 2018-07-10 DIAGNOSIS — I71.019 DISSECTION OF AORTA, THORACIC: ICD-10-CM

## 2018-07-10 PROCEDURE — 71250 CT THORAX DX C-: CPT

## 2018-07-11 ENCOUNTER — HOSPITAL ENCOUNTER (OUTPATIENT)
Dept: NUCLEAR MEDICINE | Age: 58
Discharge: HOME | End: 2018-07-11
Attending: SPECIALIST
Payer: COMMERCIAL

## 2018-07-11 DIAGNOSIS — R07.9 CHEST PAIN, UNSPECIFIED TYPE: Primary | ICD-10-CM

## 2018-07-11 LAB
D DIMER PPP FEU-MCNC: 1.31 MG/L FEU (ref 0–0.49)
TROPONIN I SERPL-MCNC: <0.01 NG/ML (ref 0–0.04)

## 2018-07-11 PROCEDURE — 78582 LUNG VENTILAT&PERFUS IMAGING: CPT

## 2018-07-11 PROCEDURE — A9567 TECHNETIUM TC-99M AEROSOL: HCPCS

## 2018-07-12 ENCOUNTER — HOSPITAL ENCOUNTER (OUTPATIENT)
Dept: NUCLEAR MEDICINE | Age: 58
Discharge: HOME | End: 2018-07-12
Attending: SPECIALIST
Payer: COMMERCIAL

## 2018-07-12 ENCOUNTER — DOCUMENTATION ONLY (OUTPATIENT)
Dept: CARDIOLOGY CLINIC | Age: 58
End: 2018-07-12

## 2018-07-12 DIAGNOSIS — R07.9 CHEST PAIN, UNSPECIFIED TYPE: ICD-10-CM

## 2018-07-12 DIAGNOSIS — R07.9 CHEST PAIN: ICD-10-CM

## 2018-07-12 PROCEDURE — 78582 LUNG VENTILAT&PERFUS IMAGING: CPT

## 2018-07-12 PROCEDURE — 71046 X-RAY EXAM CHEST 2 VIEWS: CPT

## 2018-07-12 NOTE — PROGRESS NOTES
Cardiology phone conversation    I called Ms. Tree [place] today and reviewed with her some of her testing including her slightly elevated D dimer. She states her chest discomfort is gotten significantly better and is down by about 30%. She woke this morning was doing well and then had some more. Her troponins have been less than 0.01 and her VQ scan results are still pending. Her CT scan was negative.     Connie Brock MD

## 2018-07-13 ENCOUNTER — TELEPHONE (OUTPATIENT)
Dept: CARDIOLOGY CLINIC | Age: 58
End: 2018-07-13

## 2018-07-13 ENCOUNTER — DOCUMENTATION ONLY (OUTPATIENT)
Dept: CARDIOLOGY CLINIC | Age: 58
End: 2018-07-13

## 2018-07-13 RX ORDER — METOPROLOL TARTRATE 50 MG/1
50 TABLET ORAL 2 TIMES DAILY
Qty: 180 TAB | Refills: 1 | Status: SHIPPED | OUTPATIENT
Start: 2018-07-13 | End: 2019-01-09 | Stop reason: SDUPTHER

## 2018-08-31 RX ORDER — LISINOPRIL 5 MG/1
TABLET ORAL
Qty: 30 TAB | Refills: 6 | Status: SHIPPED | OUTPATIENT
Start: 2018-08-31 | End: 2019-08-29

## 2018-10-17 RX ORDER — LISINOPRIL 5 MG/1
5 TABLET ORAL DAILY
Qty: 90 TAB | Refills: 1 | Status: SHIPPED | OUTPATIENT
Start: 2018-10-17 | End: 2018-12-12 | Stop reason: SDUPTHER

## 2018-12-12 ENCOUNTER — OFFICE VISIT (OUTPATIENT)
Dept: CARDIOLOGY CLINIC | Age: 58
End: 2018-12-12

## 2018-12-12 VITALS
OXYGEN SATURATION: 98 % | BODY MASS INDEX: 33.83 KG/M2 | WEIGHT: 179.2 LBS | DIASTOLIC BLOOD PRESSURE: 60 MMHG | HEART RATE: 62 BPM | HEIGHT: 61 IN | SYSTOLIC BLOOD PRESSURE: 110 MMHG

## 2018-12-12 DIAGNOSIS — Z82.49 FAMILY HISTORY OF CAROTID ARTERY STENOSIS: ICD-10-CM

## 2018-12-12 DIAGNOSIS — Z86.79 H/O AORTIC ANEURYSM REPAIR: ICD-10-CM

## 2018-12-12 DIAGNOSIS — Z98.890 H/O AORTIC ANEURYSM REPAIR: ICD-10-CM

## 2018-12-12 DIAGNOSIS — I65.23 CAROTID STENOSIS, BILATERAL: Primary | ICD-10-CM

## 2018-12-12 DIAGNOSIS — E78.00 HYPERCHOLESTEROLEMIA: ICD-10-CM

## 2018-12-12 DIAGNOSIS — I71.010 ASCENDING AORTIC DISSECTION: ICD-10-CM

## 2018-12-12 RX ORDER — PRAVASTATIN SODIUM 20 MG/1
20 TABLET ORAL
Qty: 90 TAB | Refills: 3 | Status: SHIPPED | OUTPATIENT
Start: 2018-12-12 | End: 2019-12-15 | Stop reason: SDUPTHER

## 2018-12-12 NOTE — PROGRESS NOTES
Patient has no cardiac complaints today     Visit Vitals  /60 (BP 1 Location: Left arm, BP Patient Position: Sitting)   Pulse 62   Ht 5' 1\" (1.549 m)   Wt 179 lb 3.2 oz (81.3 kg)   SpO2 98%   BMI 33.86 kg/m²

## 2018-12-12 NOTE — PROGRESS NOTES
LAST OFFICE VISIT : 6/06/18      ICD-10-CM ICD-9-CM   1. Carotid stenosis, bilateral I65.23 433.10     433.30   2. Ascending aortic dissection (HCC) I71.01 441.01   3. Hypercholesterolemia E78.00 272.0   4. Family history of carotid artery stenosis Z82.49 V17.49   5. H/O aortic aneurysm repair Z98.890 V45.89    Z86.79      Vic Sinha is a 62 y.o. female with dyslipidemia aortic aneurysm repair. Had a history referred for 6 month follow up. Cardiac risk factors: family history, dyslipidemia, obesity, sedentary life style  I have personally obtained the history from the patient. HISTORY OF PRESENTING ILLNESS     Mr. Jason Ridley states she is doing well. She has occasional episodes of chest pain. She has not been exercising regularly. She is trying to eat a clean diet. Patient denies any dyspnea, palpitations, syncope, orthopnea, edema or paroxysmal nocturnal dyspnea.      ACTIVE PROBLEM LIST     Patient Active Problem List    Diagnosis Date Noted    Aortic stenosis 04/29/2015    Postoperative anemia due to acute blood loss 04/27/2015    Ascending aortic dissection (HCC) 04/25/2015    Aortic dissection (HCC) 04/24/2015    Chest pain 04/23/2015    Hypercholesterolemia     Classical migraine     Anxiety and depression            PAST MEDICAL HISTORY     Past Medical History:   Diagnosis Date    Acquired absence of both cervix and uterus     Anxiety and depression     Ascending aortic dissection ([address] 4/25/2015    Re suspension of aortic valve and 26 mm Hemashield tube graft; circulatory arrest    Classical migraine     Fibroids     Hypercholesterolemia            PAST SURGICAL HISTORY     Past Surgical History:   Procedure Laterality Date    HX MASTOPEXY (BREAST LIFT)  09/2006    HX TONSILLECTOMY      HX TOTAL VAGINAL HYSTERECTOMY  2003    MS EVASC RPR AAA PSEUDOARYSM ABDL AORTA VISC RS&I  04/25/15    Aortic Valve Repair          ALLERGIES     Allergies   Allergen Reactions    Contrast Agent [Iodine] Other (comments)     Happened as a child but was sent to hospital for treatment. FAMILY HISTORY     Family History   Problem Relation Age of Onset    Other Father         Aneurysm    Heart Disease Father     Other Sister         Aneurysm    Heart Disease Mother     Heart Disease Sister     Heart Disease Maternal Grandfather     Heart Attack Maternal Grandfather     Heart Attack Sister     negative for cardiac disease       SOCIAL HISTORY     Social History     Socioeconomic History    Marital status:      Spouse name: Not on file    Number of children: Not on file    Years of education: Not on file    Highest education level: Not on file   Tobacco Use    Smoking status: Never Smoker    Smokeless tobacco: Never Used   Substance and Sexual Activity    Alcohol use: No     Alcohol/week: 0.0 oz    Drug use: No    Sexual activity: Yes     Partners: Male     Birth control/protection: Surgical     Comment: Hysterectomy         MEDICATIONS     Current Outpatient Medications   Medication Sig    lisinopril (PRINIVIL, ZESTRIL) 5 mg tablet TAKE 1 TAB BY MOUTH DAILY.  metoprolol tartrate (LOPRESSOR) 50 mg tablet Take 1 Tab by mouth two (2) times a day.  DULoxetine (CYMBALTA) 60 mg capsule Take 60 mg by mouth daily.  aspirin delayed-release 81 mg tablet Take  by mouth daily.  co-enzyme Q-10 (CO Q-10) 100 mg capsule Take 100 mg by mouth daily.  ascorbic acid (VITAMIN C) 500 mg tablet Take  by mouth.  b complex vitamins tablet Take 1 Tab by mouth daily.  Cholecalciferol, Vitamin D3, (VITAMIN D3) 1,000 unit cap Take  by mouth.  multivitamin (ONE A DAY) tablet Take 1 Tab by mouth daily.  ibuprofen (MOTRIN) 200 mg tablet Take  by mouth daily as needed for Pain.  gabapentin (NEURONTIN) 100 mg capsule 200 mg daily.  omeprazole (PRILOSEC) 40 mg capsule Take 40 mg by mouth Daily (before breakfast).     pravastatin (PRAVACHOL) 10 mg tablet Take 10 mg by mouth nightly. No current facility-administered medications for this visit. I have reviewed the nurses notes, vitals, problem list, allergy list, medical history, family, social history and medications. REVIEW OF SYMPTOMS      General: Pt denies excessive weight gain or loss. Pt is able to conduct ADL's  HEENT: Denies blurred vision, headaches, hearing loss, epistaxis and difficulty swallowing. Respiratory: Denies cough, congestion, shortness of breath, CUELLO, wheezing or stridor. Cardiovascular: Denies precordial pain, palpitations, edema or PND  Gastrointestinal: Denies poor appetite, indigestion, abdominal pain or blood in stool  Genitourinary: Denies hematuria, dysuria, increased urinary frequency  Musculoskeletal: Denies joint pain or swelling from muscles or joints  Neurologic: Denies tremor, paresthesias, headache, or sensory motor disturbance  Psychiatric: Denies confusion, insomnia, depression  Integumentray: Denies rash, itching or ulcers. Hematologic: Denies easy bruising, bleeding     PHYSICAL EXAMINATION      Vitals:    12/12/18 1402   BP: 110/60   Pulse: 62   SpO2: 98%   Weight: 179 lb 3.2 oz (81.3 kg)   Height: 5' 1\" (1.549 m)     General: Well developed, in no acute distress. HEENT: No jaundice, oral mucosa moist, no oral ulcers  Neck: Supple, no stiffness, no lymphadenopathy, supple  Heart: 2/6 systolic murmur at the RUSB unchanged  Respiratory: Clear bilaterally x 4, no wheezing or rales  Extremities:  No edema, normal cap refill, no cyanosis. Musculoskeletal: No clubbing, no deformities  Neuro: A&Ox3, speech clear, gait stable, cooperative, no focal neurologic deficits         DIAGNOSTIC DATA     1. Echocardiogram   5/22/15 - 45-55% LAE, mild to moderate MR  5/26/16- EF 60%, MR/TR/CA mild    2.  Cholesterol profile   4/24/15 - , HDL 45, ,   7/24/15 - , HDL 34, LDL 69,   1/26/16 - , HDL 47, ,   3/12/17- , HDL 42,   12/1/17- , HDL 41, , Tg 211  8/8/18- , HDL 47, ,     3. Carotid duplex/Vascular studies:   4/24/15 - Right 0-49%, Left 0-49%  8/2/17- L/R 0-9%    4. Cardiac MRI  8/4/15   1. Patient is status post interposition tube graft for ascending aortic   dissection. The ascending aorta tube graft extends from the sinotubular   junction to the aortic arch. In the mid section of the tube graft there is a   kink without limitation of the flow. Proximally the ascending aortic tube   graft measures 27 mm. In the mid section where theere is a kink the tube   graft measures 33 mm and distally it measures 25 mm prior to termination in   the aortic arch. There is no perigraft collection. Normal sinus of Valsalva. The aortic arch and proximal descending thoracic aorta is mildly dilated at   35 mm. 2. There is residual dissection extending from the level of the left common   carotid artery and the aortic arch down to the descending thoracic aorta and   abdominal aorta aorta and extending into the left common iliac and   terminating in the proximal portion of the left external iliac artery. The   right common iliac and the left internal iliac artery are free of   dissection. As expected the false lumen is larger than the true lumen. The   celiac trunk and SMA takeoff is from the true lumen. Both renal arteries   appeared to originate from the false lumen. There is no aneurysm of the   descending thoracic aorta or abdominal aorta. 3. Normal trileaflet aortic valve. No significant aortic regurgitation. 4. Mild 1+ mitral regurgitation. 5. Mild 1+ tricuspid regurgitation. 6. Normal left ventricular size and systolic function. LVEF 60%. 7. Normal right ventricular size and systolic function. RVEF 55%. 8. Normal pleura and pericardium. There is no significant effusions. 9. No intravenous contrast was used for the study. 9/4/15  1. Postoperative changes ascending aorta.  Small kink within the graft unchanged  2. Caliber of the descending thoracic aorta and abdominal aorta unchanged. 3. No paracardial effusion  4. Slow the flow versus partial thrombosis of the proximal descending   thoracic aorta false lumen    3/8/16  1. No interval change in postoperative findings from a descending aorta   dissection repair and associated appearance and size of the ace ascending   and descending aorta. 2. No change in the appearance of the residual descending aortic dissection   with true and false lumens. 5. CT Chest  7/10/18-Status post thoracic aortic repair. No evidence of a sending or descending  thoracic aortic aneurysm. No acute intrathoracic process. LABORATORY DATA            Lab Results   Component Value Date/Time    WBC 11.1 (H) 09/04/2015 12:20 PM    Hemoglobin (POC) 13.3 09/04/2015 12:32 PM    HGB 11.8 09/04/2015 12:20 PM    Hematocrit (POC) 39 09/04/2015 12:32 PM    HCT 37.8 09/04/2015 12:20 PM    PLATELET 173 76/63/0163 12:20 PM    MCV 79.4 (L) 09/04/2015 12:20 PM      Lab Results   Component Value Date/Time    Sodium 137 05/06/2015 04:57 AM    Potassium 3.6 05/06/2015 04:57 AM    Chloride 99 05/06/2015 04:57 AM    CO2 31 05/06/2015 04:57 AM    Anion gap 7 05/06/2015 04:57 AM    Glucose 109 (H) 05/06/2015 04:57 AM    BUN 14 05/06/2015 04:57 AM    Creatinine 0.58 05/06/2015 04:57 AM    BUN/Creatinine ratio 24 (H) 05/06/2015 04:57 AM    GFR est AA >60 05/06/2015 04:57 AM    GFR est non-AA >60 05/06/2015 04:57 AM    Calcium 8.7 05/06/2015 04:57 AM    Bilirubin, total 0.4 09/04/2015 12:20 PM    AST (SGOT) 19 09/04/2015 12:20 PM    Alk. phosphatase 157 (H) 09/04/2015 12:20 PM    Protein, total 8.0 09/04/2015 12:20 PM    Albumin 4.0 09/04/2015 12:20 PM    Globulin 4.0 09/04/2015 12:20 PM    A-G Ratio 1.0 (L) 09/04/2015 12:20 PM    ALT (SGPT) 23 09/04/2015 12:20 PM           ASSESSMENT/RECOMMENDATIONS:.      1. Aortic aneurysm repair  - It has been stable. Last CT demonstrated no abnormalities.  She has intermittent chest discomfort. She has never had a stress test but at 62years old I favor going forward with an exercise Cardiolite and an echo. 2. Dyslipidemia  - Cholesterol remains elevated  - I will increase her Pravachol to 20 mg daily  - I gave her a lab slip to have her lipid and LFTs tested again in 2 months   - I encouraged her to work on risk factor modification, including eating healthy and clean and exercising after her cardiac testing     3. Family history of carotid disease    4. Intermittent chest discomfort  - I will order an Exercise Cardiolite and an Echo     Return in 6 months or PRN. No orders of the defined types were placed in this encounter. Follow-up Disposition: Not on File      I have discussed the diagnosis with  Sara Mar and the intended plan as seen in the above orders. Questions were answered concerning future plans. I have discussed medication side effects and warnings with the patient as well. Thank you,  Isaias Wlofe MD for involving me in the care of  Sara Mar. Please do not hesitate to contact me for further questions/concerns. Written by Ernestina Johnson, as dictated by Ni Perea MD.     Garrick Mari MD, [address] [place] [address] Rd[address]      [facility], [address] [place] [address] [facility]      [phone] / [phone] Fax

## 2018-12-12 NOTE — PATIENT INSTRUCTIONS
Increase Pravachol to 20 mg daily  Return in 2 months for a lipid check  We will also schedule an echo and an Exercise Cardiolite

## 2019-01-09 ENCOUNTER — CLINICAL SUPPORT (OUTPATIENT)
Dept: CARDIOLOGY CLINIC | Age: 59
End: 2019-01-09

## 2019-01-09 DIAGNOSIS — R07.9 CHEST PAIN, UNSPECIFIED TYPE: Primary | ICD-10-CM

## 2019-01-09 DIAGNOSIS — I35.0 AORTIC VALVE STENOSIS, ETIOLOGY OF CARDIAC VALVE DISEASE UNSPECIFIED: ICD-10-CM

## 2019-01-09 DIAGNOSIS — E78.00 HYPERCHOLESTEROLEMIA: ICD-10-CM

## 2019-01-09 DIAGNOSIS — R07.9 CHEST PAIN, UNSPECIFIED TYPE: ICD-10-CM

## 2019-01-09 NOTE — TELEPHONE ENCOUNTER
Refill is per verbal order of Dr. Vickie Meyer.    Requested Prescriptions     Pending Prescriptions Disp Refills    metoprolol tartrate (LOPRESSOR) 50 mg tablet [Pharmacy Med Name: METOPROLOL TARTRATE 50 MG TAB] 180 Tab 0     Sig: TAKE 1 TABLET BY MOUTH TWICE A DAY

## 2019-01-10 RX ORDER — METOPROLOL TARTRATE 50 MG/1
TABLET ORAL
Qty: 180 TAB | Refills: 0 | Status: SHIPPED | OUTPATIENT
Start: 2019-01-10 | End: 2019-04-02 | Stop reason: SDUPTHER

## 2019-02-15 LAB
ALBUMIN SERPL-MCNC: 4.1 G/DL (ref 3.5–5.5)
ALP SERPL-CCNC: 77 IU/L (ref 39–117)
ALT SERPL-CCNC: 22 IU/L (ref 0–32)
AST SERPL-CCNC: 15 IU/L (ref 0–40)
BILIRUB DIRECT SERPL-MCNC: 0.1 MG/DL (ref 0–0.4)
BILIRUB SERPL-MCNC: 0.3 MG/DL (ref 0–1.2)
CHOLEST SERPL-MCNC: 182 MG/DL (ref 100–199)
HDLC SERPL-MCNC: 42 MG/DL
INTERPRETATION, 910389: NORMAL
LDLC SERPL CALC-MCNC: 111 MG/DL (ref 0–99)
PROT SERPL-MCNC: 6.4 G/DL (ref 6–8.5)
TRIGL SERPL-MCNC: 146 MG/DL (ref 0–149)
VLDLC SERPL CALC-MCNC: 29 MG/DL (ref 5–40)

## 2019-02-19 NOTE — PROGRESS NOTES
LAST OFFICE VISIT : 12/12/18 ICD-10-CM ICD-9-CM 1. Hypercholesterolemia E78.00 272.0 2. Carotid stenosis, bilateral I65.23 433.10  
  433.30  
3. Family history of carotid artery stenosis Z82.49 V17.49  
4. Ascending aortic dissection (HCC) I71.01 441.01 Jennifer Carter is a 62 y.o. female with dyslipidemia aortic aneurysm repair. Had a history referred for 6 month follow up. Cardiac risk factors: family history, dyslipidemia, obesity, sedentary life style I have personally obtained the history from the patient. HISTORY OF PRESENTING ILLNESS  
 
Mr. Feroz Shin states she is doing well. She is exercising regularly. She is trying to lose weight. Patient denies any exertional chest pain, dyspnea, palpitations, syncope, orthopnea, edema or paroxysmal nocturnal dyspnea. ACTIVE PROBLEM LIST Patient Active Problem List  
 Diagnosis Date Noted  Aortic stenosis 04/29/2015  Postoperative anemia due to acute blood loss 04/27/2015  Ascending aortic dissection (HCC) 04/25/2015  Aortic dissection ([address] 04/24/2015  Chest pain 04/23/2015  Hypercholesterolemia  Classical migraine  Anxiety and depression PAST MEDICAL HISTORY Past Medical History:  
Diagnosis Date  Acquired absence of both cervix and uterus  Anxiety and depression  Ascending aortic dissection (HCC) 4/25/2015 Re suspension of aortic valve and 26 mm Hemashield tube graft; circulatory arrest  
 Classical migraine  Fibroids  Hypercholesterolemia PAST SURGICAL HISTORY Past Surgical History:  
Procedure Laterality Date  HX MASTOPEXY (BREAST LIFT)  09/2006  HX TONSILLECTOMY  HX TOTAL VAGINAL HYSTERECTOMY  2003  MA EVASC RPR AAA PSEUDOARYSM ABDL AORTA VISC RS&I  04/25/15 Aortic Valve Repair ALLERGIES Allergies Allergen Reactions  Contrast Agent [Iodine] Other (comments) Happened as a child but was sent to hospital for treatment. FAMILY HISTORY Family History Problem Relation Age of Onset  Other Father Aneurysm  Heart Disease Father  Other Sister Aneurysm  Heart Disease Mother  Heart Disease Sister  Heart Disease Maternal Grandfather  Heart Attack Maternal Grandfather  Heart Attack Sister   
 negative for cardiac disease SOCIAL HISTORY Social History Socioeconomic History  Marital status:  Spouse name: Not on file  Number of children: Not on file  Years of education: Not on file  Highest education level: Not on file Tobacco Use  Smoking status: Never Smoker  Smokeless tobacco: Never Used Substance and Sexual Activity  Alcohol use: No  
  Alcohol/week: 0.0 oz  Drug use: No  
 Sexual activity: Yes  
  Partners: Male Birth control/protection: Surgical  
  Comment: Hysterectomy MEDICATIONS Current Outpatient Medications Medication Sig  
 metoprolol tartrate (LOPRESSOR) 50 mg tablet TAKE 1 TABLET BY MOUTH TWICE A DAY  pravastatin (PRAVACHOL) 20 mg tablet Take 1 Tab by mouth nightly.  lisinopril (PRINIVIL, ZESTRIL) 5 mg tablet TAKE 1 TAB BY MOUTH DAILY.  DULoxetine (CYMBALTA) 60 mg capsule Take 60 mg by mouth daily.  aspirin delayed-release 81 mg tablet Take  by mouth daily.  co-enzyme Q-10 (CO Q-10) 100 mg capsule Take 100 mg by mouth daily.  ascorbic acid (VITAMIN C) 500 mg tablet Take  by mouth.  b complex vitamins tablet Take 1 Tab by mouth daily.  Cholecalciferol, Vitamin D3, (VITAMIN D3) 1,000 unit cap Take  by mouth.  multivitamin (ONE A DAY) tablet Take 1 Tab by mouth daily.  ibuprofen (MOTRIN) 200 mg tablet Take  by mouth daily as needed for Pain.  gabapentin (NEURONTIN) 100 mg capsule 200 mg daily.  omeprazole (PRILOSEC) 40 mg capsule Take 40 mg by mouth Daily (before breakfast). No current facility-administered medications for this visit. I have reviewed the nurses notes, vitals, problem list, allergy list, medical history, family, social history and medications. REVIEW OF SYMPTOMS General: Pt denies excessive weight gain or loss. Pt is able to conduct ADL's HEENT: Denies blurred vision, headaches, hearing loss, epistaxis and difficulty swallowing. Respiratory: Denies cough, congestion, shortness of breath, CUELLO, wheezing or stridor. Cardiovascular: Denies precordial pain, palpitations, edema or PND Gastrointestinal: Denies poor appetite, indigestion, abdominal pain or blood in stool Genitourinary: Denies hematuria, dysuria, increased urinary frequency Musculoskeletal: Denies joint pain or swelling from muscles or joints Neurologic: Denies tremor, paresthesias, headache, or sensory motor disturbance Psychiatric: Denies confusion, insomnia, depression Integumentray: Denies rash, itching or ulcers. Hematologic: Denies easy bruising, bleeding PHYSICAL EXAMINATION Vitals:  
 02/20/19 1426 BP: 112/60 Pulse: 66 Resp: 18 SpO2: 98% Weight: 182 lb 6.4 oz (82.7 kg) Height: 5' 1\" (1.549 m) General: Well developed, in no acute distress. HEENT: No jaundice, oral mucosa moist, no oral ulcers Neck: Supple, no stiffness, no lymphadenopathy, supple Heart: 2/6 systolic murmur at the RUSB unchanged Respiratory: Clear bilaterally x 4, no wheezing or rales Extremities:  No edema, normal cap refill, no cyanosis. Musculoskeletal: No clubbing, no deformities Neuro: A&Ox3, speech clear, gait stable, cooperative, no focal neurologic deficits DIAGNOSTIC DATA 1. Echocardiogram  
5/22/15 - 45-55% LAE, mild to moderate MR 
5/26/16- EF 60%, MR/TR/WV mild 1/9/19- EF 60%, AI mild 2. Cholesterol profile 4/24/15 - , HDL 45, ,  
7/24/15 - , HDL 34, LDL 69,  
1/26/16 - , HDL 47, ,  3/12/17- , HDL 42,  
12/1/17- , HDL 41, , Tg 211 
8/8/18- , HDL 47, ,  
2/14/19- , HDL 42, ,  
 
3. Carotid duplex/Vascular studies:  
4/24/15 - Right 0-49%, Left 0-49% 8/2/17- L/R 0-9% 4. Cardiac MRI 
8/4/15 1. Patient is status post interposition tube graft for ascending aortic  
dissection. The ascending aorta tube graft extends from the sinotubular  
junction to the aortic arch. In the mid section of the tube graft there is a  
kink without limitation of the flow. Proximally the ascending aortic tube  
graft measures 27 mm. In the mid section where theere is a kink the tube  
graft measures 33 mm and distally it measures 25 mm prior to termination in  
the aortic arch. There is no perigraft collection. Normal sinus of Valsalva. The aortic arch and proximal descending thoracic aorta is mildly dilated at  
35 mm. 2. There is residual dissection extending from the level of the left common  
carotid artery and the aortic arch down to the descending thoracic aorta and  
abdominal aorta aorta and extending into the left common iliac and  
terminating in the proximal portion of the left external iliac artery. The  
right common iliac and the left internal iliac artery are free of  
dissection. As expected the false lumen is larger than the true lumen. The  
celiac trunk and SMA takeoff is from the true lumen. Both renal arteries  
appeared to originate from the false lumen. There is no aneurysm of the  
descending thoracic aorta or abdominal aorta. 3. Normal trileaflet aortic valve. No significant aortic regurgitation. 4. Mild 1+ mitral regurgitation. 5. Mild 1+ tricuspid regurgitation. 6. Normal left ventricular size and systolic function. LVEF 60%. 7. Normal right ventricular size and systolic function. RVEF 55%. 8. Normal pleura and pericardium. There is no significant effusions. 9. No intravenous contrast was used for the study. 9/4/15 1. Postoperative changes ascending aorta. Small kink within the graft  
unchanged 2. Caliber of the descending thoracic aorta and abdominal aorta unchanged. 3. No paracardial effusion 4. Slow the flow versus partial thrombosis of the proximal descending  
thoracic aorta false lumen 3/8/16 1. No interval change in postoperative findings from a descending aorta  
dissection repair and associated appearance and size of the ace ascending  
and descending aorta. 2. No change in the appearance of the residual descending aortic dissection  
with true and false lumens. 5. CT Chest 
7/10/18-Status post thoracic aortic repair. No evidence of a sending or descending 
thoracic aortic aneurysm. No acute intrathoracic process. 6. Stress Test 
Lexiscan- 1/9/19- no ischemia, EF 77% LABORATORY DATA Lab Results Component Value Date/Time WBC 11.1 (H) 09/04/2015 12:20 PM  
 Hemoglobin (POC) 13.3 09/04/2015 12:32 PM  
 HGB 11.8 09/04/2015 12:20 PM  
 Hematocrit (POC) 39 09/04/2015 12:32 PM  
 HCT 37.8 09/04/2015 12:20 PM  
 PLATELET 667 97/35/3116 12:20 PM  
 MCV 79.4 (L) 09/04/2015 12:20 PM  
  
Lab Results Component Value Date/Time Sodium 137 05/06/2015 04:57 AM  
 Potassium 3.6 05/06/2015 04:57 AM  
 Chloride 99 05/06/2015 04:57 AM  
 CO2 31 05/06/2015 04:57 AM  
 Anion gap 7 05/06/2015 04:57 AM  
 Glucose 109 (H) 05/06/2015 04:57 AM  
 BUN 14 05/06/2015 04:57 AM  
 Creatinine 0.58 05/06/2015 04:57 AM  
 BUN/Creatinine ratio 24 (H) 05/06/2015 04:57 AM  
 GFR est AA >60 05/06/2015 04:57 AM  
 GFR est non-AA >60 05/06/2015 04:57 AM  
 Calcium 8.7 05/06/2015 04:57 AM  
 Bilirubin, total 0.3 02/14/2019 08:10 AM  
 AST (SGOT) 15 02/14/2019 08:10 AM  
 Alk.  phosphatase 77 02/14/2019 08:10 AM  
 Protein, total 6.4 02/14/2019 08:10 AM  
 Albumin 4.1 02/14/2019 08:10 AM  
 Globulin 4.0 09/04/2015 12:20 PM  
 A-G Ratio 1.0 (L) 09/04/2015 12:20 PM  
 ALT (SGPT) 22 02/14/2019 08:10 AM  
  
 
 
 ASSESSMENT/RECOMMENDATIONS:.  
  
1. Aortic aneurysm repair - It has been stable. Last CT demonstrated no abnormalities. - She did have a cardiac MRI 12/12/17 that demonstrated no changes in the sinus of valsalva, which was 30 mm compared to 32 previously. - Echo 1/2019 demonstrated an EF of 60% - She has not had any genetic testing for connective tissue disorders. We will try to find a source for her to proceed with this testing. 2. Dyslipidemia - LDL goal should be under 100 - I encouraged her to work on risk factor modification, including eating healthy and clean and exercising 3. Family history of carotid disease Return in 6 months or PRN. No orders of the defined types were placed in this encounter. Follow-up Disposition: Not on File I have discussed the diagnosis with  Librado Ramírez and the intended plan as seen in the above orders. Questions were answered concerning future plans. I have discussed medication side effects and warnings with the patient as well. Thank you,  Herbert Lyons MD for involving me in the care of  Librado Ramírez. Please do not hesitate to contact me for further questions/concerns. Written by Xavi Beverly, as dictated by Latasha Diaz MD.  
 
Maribell Thurston MD, [facility] - [address], [address]     
[phone] / [phone] Fax

## 2019-02-20 ENCOUNTER — OFFICE VISIT (OUTPATIENT)
Dept: CARDIOLOGY CLINIC | Age: 59
End: 2019-02-20

## 2019-02-20 VITALS
HEART RATE: 66 BPM | RESPIRATION RATE: 18 BRPM | BODY MASS INDEX: 34.44 KG/M2 | HEIGHT: 61 IN | DIASTOLIC BLOOD PRESSURE: 60 MMHG | WEIGHT: 182.4 LBS | OXYGEN SATURATION: 98 % | SYSTOLIC BLOOD PRESSURE: 112 MMHG

## 2019-02-20 DIAGNOSIS — E78.00 HYPERCHOLESTEROLEMIA: Primary | ICD-10-CM

## 2019-02-20 DIAGNOSIS — I71.010 ASCENDING AORTIC DISSECTION: ICD-10-CM

## 2019-02-20 DIAGNOSIS — I65.23 CAROTID STENOSIS, BILATERAL: ICD-10-CM

## 2019-02-20 DIAGNOSIS — Z82.49 FAMILY HISTORY OF CAROTID ARTERY STENOSIS: ICD-10-CM

## 2019-02-20 DIAGNOSIS — E78.00 HYPERCHOLESTEREMIA: Primary | ICD-10-CM

## 2019-02-20 NOTE — PROGRESS NOTES
Shanika Stevens is a 62 y.o. female Chief Complaint Patient presents with  Aortic Aneurysm  Chest Pain (Angina)  
  follow up 1. Have you been to the ER, urgent care clinic since your last visit? Hospitalized since your last visit? 1/2019 uti  [facility] 
 
2. Have you seen or consulted any other health care providers outside of the [address] [place] [address] since your last visit? Include any pap smears or colon screening. No 
 
 
Visit Vitals /60 (BP 1 Location: Left arm, BP Patient Position: Sitting) Pulse 66 Resp 18 Ht 5' 1\" (1.549 m) Wt 182 lb 6.4 oz (82.7 kg) SpO2 98% BMI 34.46 kg/m² Health Maintenance Due Topic Date Due  
 Hepatitis C Screening  1960  
 DTaP/Tdap/Td series (1 - Tdap) 07/12/1981  Shingrix Vaccine Age 50> (1 of 2) 07/12/2010  
 FOBT Q 1 YEAR AGE 50-75  07/12/2010  PAP AKA CERVICAL CYTOLOGY  11/13/2012  Influenza Age 5 to Adult  08/01/2018

## 2019-03-08 ENCOUNTER — TELEPHONE (OUTPATIENT)
Dept: CARDIOLOGY CLINIC | Age: 59
End: 2019-03-08

## 2019-03-08 NOTE — TELEPHONE ENCOUNTER
[facility] Calling to receive the ICD codes from the lab work that was done     Refernce # [id number]

## 2019-03-22 ENCOUNTER — OFFICE VISIT (OUTPATIENT)
Dept: OBGYN CLINIC | Age: 59
End: 2019-03-22

## 2019-03-22 VITALS
HEIGHT: 61 IN | SYSTOLIC BLOOD PRESSURE: 110 MMHG | DIASTOLIC BLOOD PRESSURE: 60 MMHG | WEIGHT: 184 LBS | BODY MASS INDEX: 34.74 KG/M2

## 2019-03-22 DIAGNOSIS — Z01.419 ENCOUNTER FOR GYNECOLOGICAL EXAMINATION WITHOUT ABNORMAL FINDING: Primary | ICD-10-CM

## 2019-03-22 RX ORDER — CYCLOBENZAPRINE HCL 10 MG
10 TABLET ORAL
Qty: 270 TAB | Refills: 2 | Status: SHIPPED | OUTPATIENT
Start: 2019-03-22 | End: 2019-12-15 | Stop reason: SDUPTHER

## 2019-03-22 NOTE — PATIENT INSTRUCTIONS
Well Visit, Women 48 to 72: Care Instructions  Your Care Instructions    Physical exams can help you stay healthy. Your doctor has checked your overall health and may have suggested ways to take good care of yourself. He or she also may have recommended tests. At home, you can help prevent illness with healthy eating, regular exercise, and other steps. Follow-up care is a key part of your treatment and safety. Be sure to make and go to all appointments, and call your doctor if you are having problems. It's also a good idea to know your test results and keep a list of the medicines you take. How can you care for yourself at home? · Reach and stay at a healthy weight. This will lower your risk for many problems, such as obesity, diabetes, heart disease, and high blood pressure. · Get at least 30 minutes of exercise on most days of the week. Walking is a good choice. You also may want to do other activities, such as running, swimming, cycling, or playing tennis or team sports. · Do not smoke. Smoking can make health problems worse. If you need help quitting, talk to your doctor about stop-smoking programs and medicines. These can increase your chances of quitting for good. · Protect your skin from too much sun. When you're outdoors from 10 a.m. to 4 p.m., stay in the shade or cover up with clothing and a hat with a wide brim. Wear sunglasses that block UV rays. Even when it's cloudy, put broad-spectrum sunscreen (SPF 30 or higher) on any exposed skin. · See a dentist one or two times a year for checkups and to have your teeth cleaned. · Wear a seat belt in the car. · Limit alcohol to 1 drink a day. Too much alcohol can cause health problems. Follow your doctor's advice about when to have certain tests. These tests can spot problems early. · Cholesterol.  Your doctor will tell you how often to have this done based on your age, family history, or other things that can increase your risk for heart attack and stroke. · Blood pressure. Have your blood pressure checked during a routine doctor visit. Your doctor will tell you how often to check your blood pressure based on your age, your blood pressure results, and other factors. · Mammogram. Ask your doctor how often you should have a mammogram, which is an X-ray of your breasts. A mammogram can spot breast cancer before it can be felt and when it is easiest to treat. · Pap test and pelvic exam. Ask your doctor how often you should have a Pap test. You may not need to have a Pap test as often as you used to. · Vision. Have your eyes checked every year or two or as often as your doctor suggests. Some experts recommend that you have yearly exams for glaucoma and other age-related eye problems starting at age 48. · Hearing. Tell your doctor if you notice any change in your hearing. You can have tests to find out how well you hear. · Diabetes. Ask your doctor whether you should have tests for diabetes. · Colon cancer. You should begin tests for colon cancer at age 48. You may have one of several tests. Your doctor will tell you how often to have tests based on your age and risk. Risks include whether you already had a precancerous polyp removed from your colon or whether your parents, sisters and brothers, or children have had colon cancer. · Thyroid disease. Talk to your doctor about whether to have your thyroid checked as part of a regular physical exam. Women have an increased chance of a thyroid problem. · Osteoporosis. You should begin tests for bone density at age 72. If you are younger than 72, ask your doctor whether you have factors that may increase your risk for this disease. You may want to have this test before age 72. · Heart attack and stroke risk. At least every 4 to 6 years, you should have your risk for heart attack and stroke assessed.  Your doctor uses factors such as your age, blood pressure, cholesterol, and whether you smoke or have diabetes to show what your risk for a heart attack or stroke is over the next 10 years. When should you call for help? Watch closely for changes in your health, and be sure to contact your doctor if you have any problems or symptoms that concern you. Where can you learn more? Go to http://lionel-cynthia.info/. Enter H615 in the search box to learn more about \"Well Visit, Women 50 to 72: Care Instructions. \"  Current as of: March 28, 2018  Content Version: 11.9  © 3735-1084 [employer]. Care instructions adapted under license by [employer] (which disclaims liability or warranty for this information). If you have questions about a medical condition or this instruction, always ask your healthcare professional. [address] any warranty or liability for your use of this information.

## 2019-03-22 NOTE — PROGRESS NOTES
Annual exam ages 40-58 post hysterectomy    Shalonda Celaya is a ,  62 y.o. female [facility] No LMP recorded. Patient has had a hysterectomy. .    She presents for her annual checkup. She is having lower back pain and fatigue. Mole on her vulva she wants me to check. Unchanged for many years. With regard to the Gardasil vaccine, she is older than the age for which it is FDA approved. Hormonal status:  She reports no perimenstrual type symptoms. She is not having vasomotor symptoms. The patient is not using any ERT. Sexual history:    She  reports that she currently engages in sexual activity and has had partners who are Male. She reports using the following method of birth control/protection: Surgical.    Medical conditions:    Since her last annual GYN exam about one year ago, she has not the following changes in her health history: none. Surgical history confirmed with patient. has a past surgical history that includes hx mastopexy (breast lift) (2006); hx tonsillectomy; pr evasc rpr aaa pseudoarysm abdl aorta visc rs&i (04/25/15); and hx total vaginal hysterectomy (). Pap and Mammogram History:    Her most recent Pap smear was normal, obtained 9 year(s) ago. The patient had her mammogram today in our office. Breast Cancer History/Substance Abuse: negative    Osteoporosis History:    Family history does not include a first or second degree relative with osteopenia or osteoporosis.     A bone density scan has not been obtained    Past Medical History:   Diagnosis Date    Acquired absence of both cervix and uterus     Anxiety and depression     Ascending aortic dissection ([address] 2015    Re suspension of aortic valve and 26 mm Hemashield tube graft; circulatory arrest    Classical migraine     Fibroids     Hypercholesterolemia     Routine Papanicolaou smear  neg     Past Surgical History:   Procedure Laterality Date    HX MASTOPEXY (BREAST LIFT)  2006  HX TONSILLECTOMY      HX TOTAL VAGINAL HYSTERECTOMY  2003    NC EVASC RPR AAA PSEUDOARYSM ABDL AORTA VISC RS&I  04/25/15    Aortic Valve Repair       Current Outpatient Medications   Medication Sig Dispense Refill    metoprolol tartrate (LOPRESSOR) 50 mg tablet TAKE 1 TABLET BY MOUTH TWICE A  Tab 0    pravastatin (PRAVACHOL) 20 mg tablet Take 1 Tab by mouth nightly. 90 Tab 3    lisinopril (PRINIVIL, ZESTRIL) 5 mg tablet TAKE 1 TAB BY MOUTH DAILY. 30 Tab 6    DULoxetine (CYMBALTA) 60 mg capsule Take 60 mg by mouth daily.  aspirin delayed-release 81 mg tablet Take  by mouth daily.  co-enzyme Q-10 (CO Q-10) 100 mg capsule Take 100 mg by mouth daily.  ascorbic acid (VITAMIN C) 500 mg tablet Take  by mouth.  b complex vitamins tablet Take 1 Tab by mouth daily.  Cholecalciferol, Vitamin D3, (VITAMIN D3) 1,000 unit cap Take  by mouth.  multivitamin (ONE A DAY) tablet Take 1 Tab by mouth daily.  ibuprofen (MOTRIN) 200 mg tablet Take  by mouth daily as needed for Pain.  gabapentin (NEURONTIN) 100 mg capsule 200 mg daily.  omeprazole (PRILOSEC) 40 mg capsule Take 40 mg by mouth Daily (before breakfast). Allergies: Contrast agent [iodine]     Tobacco History:  reports that she has never smoked. She has never used smokeless tobacco.  Alcohol Abuse:  reports that she does not drink alcohol. Drug Abuse:  reports that she does not use drugs.     Family Medical/Cancer History:   Family History   Problem Relation Age of Onset    Other Father         Aneurysm    Heart Disease Father     Other Sister         Aneurysm    Heart Disease Mother     Heart Disease Sister     Heart Disease Maternal Grandfather     Heart Attack Maternal Grandfather     Heart Attack Sister         Review of Systems - History obtained from the patient  Constitutional: negative for weight loss, fever, night sweats  HEENT: negative for hearing loss, earache, congestion, snoring, sorethroat  CV: negative for chest pain, palpitations, edema  Resp: negative for cough, shortness of breath, wheezing  GI: negative for change in bowel habits, abdominal pain, black or bloody stools  : negative for frequency, dysuria, hematuria, vaginal discharge  MSK: negative for back pain, joint pain, muscle pain  Breast: negative for breast lumps, nipple discharge, galactorrhea  Skin :negative for itching, rash, hives  Neuro: negative for dizziness, headache, confusion, weakness  Psych: negative for anxiety, depression, change in mood  Heme/lymph: negative for bleeding, bruising, pallor    Physical Exam    Visit Vitals  /60   Ht 5' 1\" (1.549 m)   Wt 184 lb (83.5 kg)   BMI 34.77 kg/m²     Constitutional  · Appearance: well-nourished, well developed, alert, in no acute distress    HENT  · Head and Face: appears normal    Neck  · Inspection/Palpation: normal appearance, no masses or tenderness  · Lymph Nodes: no lymphadenopathy present  · Thyroid: gland size normal, nontender, no nodules or masses present on palpation    Chest  · Respiratory Effort: breathing unlabored  · Auscultation: normal breath sounds    Cardiovascular  · Heart:  · Auscultation: regular rate and rhythm without murmur    Breasts  · Inspection of Breasts: breasts symmetrical, no skin changes, no discharge present, nipple appearance normal, no skin retraction present  · Palpation of Breasts and Axillae: no masses present on palpation, no breast tenderness  · Axillary Lymph Nodes: no lymphadenopathy present    Gastrointestinal  · Abdominal Examination: abdomen non-tender to palpation, normal bowel sounds, no masses present  · Liver and spleen: no hepatomegaly present, spleen not palpable  · Hernias: no hernias identified    Genitourinary  · External Genitalia: normal appearance for age with benign nevus right labia, no discharge present, no tenderness present, no inflammatory lesions present, no masses present, no atrophy present  · Vagina: normal vaginal vault without central or paravaginal defects, no discharge present, no inflammatory lesions present, no masses present  · Bladder: non-tender to palpation  · Urethra: appears normal  · Cervix: absent  · Uterus: absent  · Adnexa: no adnexal tenderness present, no adnexal masses present  · Perineum: perineum within normal limits, no evidence of trauma, no rashes or skin lesions present  · Anus: anus within normal limits, no hemorrhoids present  · Inguinal Lymph Nodes: no lymphadenopathy present    Skin  · General Inspection: no rash, no lesions identified    Neurologic/Psychiatric  · Mental Status:  · Orientation: grossly oriented to person, place and time  · Mood and Affect: mood normal, affect appropriate    Assessment:  Routine gynecologic examination  Her current medical status is satisfactory with no evidence of significant gynecologic issues.     Plan:  Counseled re: diet, exercise, healthy lifestyle  Return for yearly wellness visits  Rec annual mammogram

## 2019-04-02 RX ORDER — METOPROLOL TARTRATE 50 MG/1
TABLET ORAL
Qty: 180 TAB | Refills: 1 | Status: SHIPPED | OUTPATIENT
Start: 2019-04-02 | End: 2019-10-13 | Stop reason: SDUPTHER

## 2019-04-02 NOTE — TELEPHONE ENCOUNTER
Refill is per verbal order of Dr. Luly Winslow.    Requested Prescriptions     Pending Prescriptions Disp Refills    metoprolol tartrate (LOPRESSOR) 50 mg tablet [Pharmacy Med Name: METOPROLOL TARTRATE 50 MG TAB] 180 Tab 1     Sig: TAKE 1 TABLET BY MOUTH TWICE A DAY

## 2019-04-09 NOTE — TELEPHONE ENCOUNTER
Refill is per verbal order of Dr. Severiano Holding.    Requested Prescriptions     Pending Prescriptions Disp Refills    lisinopril (PRINIVIL, ZESTRIL) 5 mg tablet [Pharmacy Med Name: LISINOPRIL 5 MG TABLET] 90 Tab 1     Sig: TAKE 1 TABLET BY MOUTH EVERY DAY

## 2019-04-10 RX ORDER — LISINOPRIL 5 MG/1
TABLET ORAL
Qty: 90 TAB | Refills: 1 | Status: SHIPPED | OUTPATIENT
Start: 2019-04-10 | End: 2019-09-30 | Stop reason: SDUPTHER

## 2019-08-29 ENCOUNTER — OFFICE VISIT (OUTPATIENT)
Dept: CARDIOLOGY CLINIC | Age: 59
End: 2019-08-29

## 2019-08-29 VITALS
HEART RATE: 68 BPM | SYSTOLIC BLOOD PRESSURE: 114 MMHG | BODY MASS INDEX: 35.12 KG/M2 | DIASTOLIC BLOOD PRESSURE: 70 MMHG | OXYGEN SATURATION: 93 % | WEIGHT: 186 LBS | RESPIRATION RATE: 16 BRPM | HEIGHT: 61 IN

## 2019-08-29 DIAGNOSIS — E66.9 CLASS 2 OBESITY WITH BODY MASS INDEX (BMI) OF 35.0 TO 35.9 IN ADULT, UNSPECIFIED OBESITY TYPE, UNSPECIFIED WHETHER SERIOUS COMORBIDITY PRESENT: ICD-10-CM

## 2019-08-29 DIAGNOSIS — I71.019 DISSECTION OF THORACIC AORTA: ICD-10-CM

## 2019-08-29 DIAGNOSIS — I35.0 AORTIC VALVE STENOSIS, ETIOLOGY OF CARDIAC VALVE DISEASE UNSPECIFIED: ICD-10-CM

## 2019-08-29 DIAGNOSIS — I35.0 AORTIC VALVE STENOSIS, ETIOLOGY OF CARDIAC VALVE DISEASE UNSPECIFIED: Primary | ICD-10-CM

## 2019-08-29 DIAGNOSIS — E78.5 DYSLIPIDEMIA: ICD-10-CM

## 2019-08-29 PROBLEM — E66.01 SEVERE OBESITY (HCC): Status: ACTIVE | Noted: 2019-08-29

## 2019-08-29 NOTE — PROGRESS NOTES
LAST OFFICE VISIT : 2/20/19      ICD-10-CM ICD-9-CM   1. Aortic valve stenosis, etiology of cardiac valve disease unspecified I35.0 424.1   2. Dissection of thoracic aorta (HCC) I71.01 441.01   3. Dyslipidemia E78.5 272.4   4. Class 2 obesity with body mass index (BMI) of 35.0 to 35.9 in adult, unspecified obesity type, unspecified whether serious comorbidity present E66.9 278.00    Z68.35 V85.35        Librado David is a 61 y.o. female with dyslipidemia aortic aneurysm repair. Last seen by me 6 months ago. Cardiac risk factors: family history, dyslipidemia, obesity, sedentary life style  I have personally obtained the history from the patient. HISTORY OF PRESENTING ILLNESS     Librado David reports she is doing well overall with no interval issues. She is not exercising. She has started menopause recently. She uses a mouth appliance in lieu of CPAP. Patient denies any exertional chest pain, dyspnea, palpitations, syncope, orthopnea, edema or paroxysmal nocturnal dyspnea.        ACTIVE PROBLEM LIST     Patient Active Problem List    Diagnosis Date Noted    Severe obesity ([address] 08/29/2019    Aortic stenosis 04/29/2015    Postoperative anemia due to acute blood loss 04/27/2015    Ascending aortic dissection (HCC) 04/25/2015    Aortic dissection (HCC) 04/24/2015    Chest pain 04/23/2015    Hypercholesterolemia     Classical migraine     Anxiety and depression            PAST MEDICAL HISTORY     Past Medical History:   Diagnosis Date    Acquired absence of both cervix and uterus     Anxiety and depression     Ascending aortic dissection ([address] 4/25/2015    Re suspension of aortic valve and 26 mm Hemashield tube graft; circulatory arrest    Classical migraine     Essential hypertension     Fibroids     Hypercholesterolemia     Routine Papanicolaou smear 2009 neg           PAST SURGICAL HISTORY     Past Surgical History:   Procedure Laterality Date    HX MASTOPEXY (BREAST LIFT)  09/2006  HX TONSILLECTOMY      HX TOTAL VAGINAL HYSTERECTOMY  2003    TN EVASC RPR AAA PSEUDOARYSM ABDL AORTA VISC RS&I  04/25/15    Aortic Valve Repair          ALLERGIES     Allergies   Allergen Reactions    Contrast Agent [Iodine] Other (comments)     Happened as a child but was sent to hospital for treatment. FAMILY HISTORY     Family History   Problem Relation Age of Onset    Other Father         Aneurysm    Heart Disease Father     Other Sister         Aneurysm    Heart Disease Mother     Heart Disease Sister     Heart Disease Maternal Grandfather     Heart Attack Maternal Grandfather     Heart Attack Sister     negative for cardiac disease       SOCIAL HISTORY     Social History     Socioeconomic History    Marital status:      Spouse name: Not on file    Number of children: Not on file    Years of education: Not on file    Highest education level: Not on file   Tobacco Use    Smoking status: Never Smoker    Smokeless tobacco: Never Used   Substance and Sexual Activity    Alcohol use: No     Alcohol/week: 0.0 standard drinks    Drug use: No    Sexual activity: Yes     Partners: Male     Birth control/protection: Surgical     Comment: Hysterectomy         MEDICATIONS     Current Outpatient Medications   Medication Sig    OTHER Indications: for UTI    lisinopril (PRINIVIL, ZESTRIL) 5 mg tablet TAKE 1 TABLET BY MOUTH EVERY DAY    metoprolol tartrate (LOPRESSOR) 50 mg tablet TAKE 1 TABLET BY MOUTH TWICE A DAY    cyclobenzaprine (FLEXERIL) 10 mg tablet Take 1 Tab by mouth three (3) times daily as needed for Muscle Spasm(s).  pravastatin (PRAVACHOL) 20 mg tablet Take 1 Tab by mouth nightly.  DULoxetine (CYMBALTA) 60 mg capsule Take 60 mg by mouth daily.  aspirin delayed-release 81 mg tablet Take  by mouth daily.  co-enzyme Q-10 (CO Q-10) 100 mg capsule Take 100 mg by mouth daily.  ascorbic acid (VITAMIN C) 500 mg tablet Take  by mouth.     b complex vitamins tablet Take 1 Tab by mouth daily.  Cholecalciferol, Vitamin D3, (VITAMIN D3) 1,000 unit cap Take  by mouth.  multivitamin (ONE A DAY) tablet Take 1 Tab by mouth daily.  ibuprofen (MOTRIN) 200 mg tablet Take  by mouth daily as needed for Pain.  gabapentin (NEURONTIN) 100 mg capsule 200 mg daily.  omeprazole (PRILOSEC) 40 mg capsule Take 40 mg by mouth Daily (before breakfast). No current facility-administered medications for this visit. I have reviewed the nurses notes, vitals, problem list, allergy list, medical history, family, social history and medications. REVIEW OF SYMPTOMS      General: Pt denies excessive weight gain or loss. Pt is able to conduct ADL's  HEENT: Denies blurred vision, headaches, hearing loss, epistaxis and difficulty swallowing. Respiratory: Denies cough, congestion, shortness of breath, CUELLO, wheezing or stridor. Cardiovascular: Denies precordial pain, palpitations, edema or PND  Gastrointestinal: Denies poor appetite, indigestion, abdominal pain or blood in stool  Genitourinary: Denies hematuria, dysuria, increased urinary frequency  Musculoskeletal: Denies joint pain or swelling from muscles or joints  Neurologic: Denies tremor, paresthesias, headache, or sensory motor disturbance  Psychiatric: Denies confusion, insomnia, depression  Integumentray: Denies rash, itching or ulcers. Hematologic: Denies easy bruising, bleeding     PHYSICAL EXAMINATION      Vitals:    08/29/19 0859   BP: 114/70   Pulse: 68   Resp: 16   SpO2: 93%   Weight: 186 lb (84.4 kg)   Height: 5' 1\" (1.549 m)     General: Well developed, in no acute distress. HEENT: No jaundice, oral mucosa moist, no oral ulcers  Neck: Supple, no stiffness, no lymphadenopathy, supple  Heart: 2/6 systolic murmur at the RUSB unchanged  Respiratory: Clear bilaterally x 4, no wheezing or rales  Extremities:  No edema, normal cap refill, no cyanosis.   Musculoskeletal: No clubbing, no deformities  Neuro: A&Ox3, speech clear, gait stable, cooperative, no focal neurologic deficits         DIAGNOSTIC DATA     1. Echocardiogram   5/22/15 - 45-55% LAE, mild to moderate MR  5/26/16- EF 60%, MR/TR/UT mild  1/9/19- EF 60%, AI mild    2. Cholesterol profile   4/24/15 - , HDL 45, ,   7/24/15 - , HDL 34, LDL 69,   1/26/16 - , HDL 47, ,   3/12/17- , HDL 42,   12/1/17- , HDL 41, , Tg 211  8/8/18- , HDL 47, ,   2/14/19- , HDL 42, ,     3. Carotid duplex/Vascular studies:   4/24/15 - Right 0-49%, Left 0-49%  8/2/17- L/R 0-9%    4. Cardiac MRI  8/4/15   1. Patient is status post interposition tube graft for ascending aortic   dissection. The ascending aorta tube graft extends from the sinotubular   junction to the aortic arch. In the mid section of the tube graft there is a   kink without limitation of the flow. Proximally the ascending aortic tube   graft measures 27 mm. In the mid section where theere is a kink the tube   graft measures 33 mm and distally it measures 25 mm prior to termination in   the aortic arch. There is no perigraft collection. Normal sinus of Valsalva. The aortic arch and proximal descending thoracic aorta is mildly dilated at   35 mm. 2. There is residual dissection extending from the level of the left common   carotid artery and the aortic arch down to the descending thoracic aorta and   abdominal aorta aorta and extending into the left common iliac and   terminating in the proximal portion of the left external iliac artery. The   right common iliac and the left internal iliac artery are free of   dissection. As expected the false lumen is larger than the true lumen. The   celiac trunk and SMA takeoff is from the true lumen. Both renal arteries   appeared to originate from the false lumen. There is no aneurysm of the   descending thoracic aorta or abdominal aorta. 3. Normal trileaflet aortic valve.  No significant aortic regurgitation. 4. Mild 1+ mitral regurgitation. 5. Mild 1+ tricuspid regurgitation. 6. Normal left ventricular size and systolic function. LVEF 60%. 7. Normal right ventricular size and systolic function. RVEF 55%. 8. Normal pleura and pericardium. There is no significant effusions. 9. No intravenous contrast was used for the study. 9/4/15  1. Postoperative changes ascending aorta. Small kink within the graft   unchanged  2. Caliber of the descending thoracic aorta and abdominal aorta unchanged. 3. No paracardial effusion  4. Slow the flow versus partial thrombosis of the proximal descending   thoracic aorta false lumen    3/8/16  1. No interval change in postoperative findings from a descending aorta   dissection repair and associated appearance and size of the ace ascending   and descending aorta. 2. No change in the appearance of the residual descending aortic dissection   with true and false lumens. 5. CT Chest  7/10/18-Status post thoracic aortic repair. No evidence of a sending or descending  thoracic aortic aneurysm. No acute intrathoracic process.     6. Stress Test  Lexiscan- 1/9/19- no ischemia, EF 77%         LABORATORY DATA            Lab Results   Component Value Date/Time    WBC 11.1 (H) 09/04/2015 12:20 PM    Hemoglobin (POC) 13.3 09/04/2015 12:32 PM    HGB 11.8 09/04/2015 12:20 PM    Hematocrit (POC) 39 09/04/2015 12:32 PM    HCT 37.8 09/04/2015 12:20 PM    PLATELET 417 11/73/3881 12:20 PM    MCV 79.4 (L) 09/04/2015 12:20 PM      Lab Results   Component Value Date/Time    Sodium 137 05/06/2015 04:57 AM    Potassium 3.6 05/06/2015 04:57 AM    Chloride 99 05/06/2015 04:57 AM    CO2 31 05/06/2015 04:57 AM    Anion gap 7 05/06/2015 04:57 AM    Glucose 109 (H) 05/06/2015 04:57 AM    BUN 14 05/06/2015 04:57 AM    Creatinine 0.58 05/06/2015 04:57 AM    BUN/Creatinine ratio 24 (H) 05/06/2015 04:57 AM    GFR est AA >60 05/06/2015 04:57 AM    GFR est non-AA >60 05/06/2015 04:57 AM    Calcium 8.7 05/06/2015 04:57 AM    Bilirubin, total 0.3 02/14/2019 08:10 AM    AST (SGOT) 15 02/14/2019 08:10 AM    Alk. phosphatase 77 02/14/2019 08:10 AM    Protein, total 6.4 02/14/2019 08:10 AM    Albumin 4.1 02/14/2019 08:10 AM    Globulin 4.0 09/04/2015 12:20 PM    A-G Ratio 1.0 (L) 09/04/2015 12:20 PM    ALT (SGPT) 22 02/14/2019 08:10 AM           ASSESSMENT/RECOMMENDATIONS:.      1. Aortic aneurysm repair  -Last CT Dec 2017 demonstrated no change. Will repeat MRI in 6 months. - She will look into genetic testing as we were not able to provide her with any source    2. Dyslipidemia  - LDL goal should be under 100  - I encouraged her to work on risk factor modification, including eating healthy and clean and exercising     3. Family history of carotid disease    Return in 6 months or PRN. Orders Placed This Encounter    HEPATIC FUNCTION PANEL     Standing Status:   Future     Standing Expiration Date:   8/29/2020    LIPID PANEL    OTHER     Sig: Indications: for UTI        Follow-up and Dispositions  ·   Return in about 6 months (around 2/29/2020). I have discussed the diagnosis with  Bárbara Alas and the intended plan as seen in the above orders. Questions were answered concerning future plans. I have discussed medication side effects and warnings with the patient as well. Thank you,  Radha Cornell MD for involving me in the care of  Bárbara Alas. Please do not hesitate to contact me for further questions/concerns. Written by Caroline Lorenzo, as dictated by Deangelo Presley MD.      Salina Chase. MD [place], [address]      [address], [address] [place] [address], [facility] [address]      [phone] / [phone] Fax

## 2019-08-29 NOTE — LETTER
8/29/19 Patient: Jt Lopez YOB: 1960 Date of Visit: 8/29/2019 Ton Torrez MD 
[address] [place] [address] VIA Facsimile: [phone] Dear Ton Torrez MD, Thank you for referring Ms. Zhanna John to [facility] for evaluation. My notes for this consultation are attached. If you have questions, please do not hesitate to call me. I look forward to following your patient along with you.  
 
 
Sincerely, 
 
Jaden Hanna MD

## 2019-08-29 NOTE — PROGRESS NOTES
Room # 9  Visit Vitals  /70 (BP 1 Location: Left arm, BP Patient Position: Sitting)   Pulse 68   Resp 16   Ht 5' 1\" (1.549 m)   Wt 186 lb (84.4 kg)   SpO2 93%   BMI 35.14 kg/m²

## 2019-09-30 RX ORDER — LISINOPRIL 5 MG/1
TABLET ORAL
Qty: 90 TAB | Refills: 3 | Status: SHIPPED | OUTPATIENT
Start: 2019-09-30 | End: 2020-10-02 | Stop reason: SDUPTHER

## 2019-10-14 RX ORDER — METOPROLOL TARTRATE 50 MG/1
TABLET ORAL
Qty: 180 TAB | Refills: 1 | Status: SHIPPED | OUTPATIENT
Start: 2019-10-14 | End: 2020-02-07 | Stop reason: SDUPTHER

## 2019-10-23 ENCOUNTER — HOSPITAL ENCOUNTER (OUTPATIENT)
Dept: MRI IMAGING | Age: 59
Discharge: HOME | End: 2019-10-23
Attending: SPECIALIST
Payer: COMMERCIAL

## 2019-10-23 DIAGNOSIS — E78.5 DYSLIPIDEMIA: ICD-10-CM

## 2019-10-23 DIAGNOSIS — E66.9 CLASS 2 OBESITY WITH BODY MASS INDEX (BMI) OF 35.0 TO 35.9 IN ADULT, UNSPECIFIED OBESITY TYPE, UNSPECIFIED WHETHER SERIOUS COMORBIDITY PRESENT: ICD-10-CM

## 2019-10-23 DIAGNOSIS — I35.0 AORTIC VALVE STENOSIS, ETIOLOGY OF CARDIAC VALVE DISEASE UNSPECIFIED: ICD-10-CM

## 2019-10-23 DIAGNOSIS — I71.019 DISSECTION OF THORACIC AORTA: ICD-10-CM

## 2019-10-23 PROCEDURE — 75557 CARDIAC MRI FOR MORPH: CPT

## 2019-12-18 RX ORDER — PRAVASTATIN SODIUM 20 MG/1
TABLET ORAL
Qty: 90 TAB | Refills: 1 | Status: SHIPPED | OUTPATIENT
Start: 2019-12-18 | End: 2020-06-15

## 2020-02-07 RX ORDER — METOPROLOL TARTRATE 50 MG/1
TABLET ORAL
Qty: 180 TAB | Refills: 1 | Status: SHIPPED | OUTPATIENT
Start: 2020-02-07 | End: 2020-10-02 | Stop reason: SDUPTHER

## 2020-03-12 ENCOUNTER — OFFICE VISIT (OUTPATIENT)
Dept: CARDIOLOGY CLINIC | Age: 60
End: 2020-03-12

## 2020-03-12 VITALS
WEIGHT: 185 LBS | DIASTOLIC BLOOD PRESSURE: 80 MMHG | HEIGHT: 61 IN | BODY MASS INDEX: 34.93 KG/M2 | OXYGEN SATURATION: 98 % | HEART RATE: 77 BPM | SYSTOLIC BLOOD PRESSURE: 120 MMHG

## 2020-03-12 DIAGNOSIS — I35.0 AORTIC VALVE STENOSIS, ETIOLOGY OF CARDIAC VALVE DISEASE UNSPECIFIED: Primary | ICD-10-CM

## 2020-03-12 DIAGNOSIS — E78.5 DYSLIPIDEMIA: ICD-10-CM

## 2020-03-12 DIAGNOSIS — I71.010 ASCENDING AORTIC DISSECTION: ICD-10-CM

## 2020-03-12 NOTE — LETTER
3/12/20 Patient: Karla Cali YOB: 1960 Date of Visit: 3/12/2020 Carlos Evans MD 
[address] [place] [address] VIA Facsimile: [phone] Dear Carlos Evans MD, Thank you for referring Ms. China Selby to [facility] for evaluation. My notes for this consultation are attached. If you have questions, please do not hesitate to call me. I look forward to following your patient along with you.  
 
 
Sincerely, 
 
Zahira Chase MD

## 2020-03-12 NOTE — PROGRESS NOTES
LAST OFFICE VISIT : 8/29/19      ICD-10-CM ICD-9-CM   1. Aortic valve stenosis, etiology of cardiac valve disease unspecified I35.0 424.1   2. Ascending aortic dissection (HCC) I71.01 441.01   3. Dyslipidemia E78.5 272.4        Wanda Rodriguez is a 61 y.o. female with dyslipidemia aortic aneurysm repair. Last seen by me 6 months ago. Cardiac risk factors: family history, dyslipidemia, obesity, sedentary life style, postmenopausal  I have personally obtained the history from the patient. HISTORY OF PRESENTING ILLNESS     Wanda Rodriguez reports she is doing well overall with no interval issues. She is struggling with exercise and diet. She uses a mouth appliance in lieu of CPAP. Patient denies any exertional chest pain, dyspnea, palpitations, syncope, orthopnea, edema or paroxysmal nocturnal dyspnea.        ACTIVE PROBLEM LIST     Patient Active Problem List    Diagnosis Date Noted    Severe obesity ([facility] 08/29/2019    Aortic stenosis 04/29/2015    Postoperative anemia due to acute blood loss 04/27/2015    Ascending aortic dissection (HCC) 04/25/2015    Aortic dissection (HCC) 04/24/2015    Chest pain 04/23/2015    Hypercholesterolemia     Classical migraine     Anxiety and depression            PAST MEDICAL HISTORY     Past Medical History:   Diagnosis Date    Acquired absence of both cervix and uterus     Anxiety and depression     Ascending aortic dissection ([facility] 4/25/2015    Re suspension of aortic valve and 26 mm Hemashield tube graft; circulatory arrest    Classical migraine     Essential hypertension     Fibroids     Hypercholesterolemia     Routine Papanicolaou smear 2009 neg           PAST SURGICAL HISTORY     Past Surgical History:   Procedure Laterality Date    HX MASTOPEXY (BREAST LIFT)  09/2006    HX TONSILLECTOMY      HX TOTAL VAGINAL HYSTERECTOMY  2003    LA EVASC RPR AAA PSEUDOARYSM ABDL AORTA VISC RS&I  04/25/15    Aortic Valve Repair          ALLERGIES Allergies   Allergen Reactions    Contrast Agent [Iodine] Other (comments)     Happened as a child but was sent to hospital for treatment. FAMILY HISTORY     Family History   Problem Relation Age of Onset    Other Father         Aneurysm    Heart Disease Father     Other Sister         Aneurysm    Heart Disease Mother     Heart Disease Sister     Heart Disease Maternal Grandfather     Heart Attack Maternal Grandfather     Heart Attack Sister     negative for cardiac disease       SOCIAL HISTORY     Social History     Socioeconomic History    Marital status:      Spouse name: Not on file    Number of children: Not on file    Years of education: Not on file    Highest education level: Not on file   Tobacco Use    Smoking status: Never Smoker    Smokeless tobacco: Never Used   Substance and Sexual Activity    Alcohol use: No     Alcohol/week: 0.0 standard drinks    Drug use: No    Sexual activity: Yes     Partners: Male     Birth control/protection: Surgical     Comment: Hysterectomy         MEDICATIONS     Current Outpatient Medications   Medication Sig    metoprolol tartrate (LOPRESSOR) 50 mg tablet TAKE 1 TABLET BY MOUTH TWICE A DAY    pravastatin (PRAVACHOL) 20 mg tablet TAKE 1 TABLET BY MOUTH EVERY DAY AT NIGHT    lisinopril (PRINIVIL, ZESTRIL) 5 mg tablet TAKE 1 TABLET BY MOUTH EVERY DAY    DULoxetine (CYMBALTA) 60 mg capsule Take 60 mg by mouth daily.  aspirin delayed-release 81 mg tablet Take  by mouth daily.  co-enzyme Q-10 (CO Q-10) 100 mg capsule Take 100 mg by mouth daily.  ascorbic acid (VITAMIN C) 500 mg tablet Take  by mouth.  b complex vitamins tablet Take 1 Tab by mouth daily.  Cholecalciferol, Vitamin D3, (VITAMIN D3) 1,000 unit cap Take  by mouth.  multivitamin (ONE A DAY) tablet Take 1 Tab by mouth daily.  ibuprofen (MOTRIN) 200 mg tablet Take  by mouth daily as needed for Pain.  gabapentin (NEURONTIN) 100 mg capsule 200 mg daily.  omeprazole (PRILOSEC) 40 mg capsule Take 40 mg by mouth Daily (before breakfast).  cyclobenzaprine (FLEXERIL) 10 mg tablet TAKE 1 TABLET BY MOUTH THREE TIMES A DAY AS NEEDED FOR MUSCLE SPASMS    OTHER Indications: for UTI     No current facility-administered medications for this visit. I have reviewed the nurses notes, vitals, problem list, allergy list, medical history, family, social history and medications. REVIEW OF SYMPTOMS      General: Pt denies excessive weight gain or loss. Pt is able to conduct ADL's  HEENT: Denies blurred vision, headaches, hearing loss, epistaxis and difficulty swallowing. Respiratory: Denies cough, congestion, shortness of breath, CUELLO, wheezing or stridor. Cardiovascular: Denies precordial pain, palpitations, edema or PND  Gastrointestinal: Denies poor appetite, indigestion, abdominal pain or blood in stool  Genitourinary: Denies hematuria, dysuria, increased urinary frequency  Musculoskeletal: Denies joint pain or swelling from muscles or joints  Neurologic: Denies tremor, paresthesias, headache, or sensory motor disturbance  Psychiatric: Denies confusion, insomnia, depression  Integumentray: Denies rash, itching or ulcers. Hematologic: Denies easy bruising, bleeding     PHYSICAL EXAMINATION      Vitals:    03/12/20 0908   BP: 120/80   Pulse: 77   SpO2: 98%   Weight: 185 lb (83.9 kg)   Height: 5' 1\" (1.549 m)     General: Well developed, in no acute distress. HEENT: No jaundice, oral mucosa moist, no oral ulcers  Neck: Supple, no stiffness, no lymphadenopathy, supple  Heart: 2/6 systolic murmur at the RUSB unchanged  Respiratory: Clear bilaterally x 4, no wheezing or rales  Extremities:  No edema, normal cap refill, no cyanosis.   Musculoskeletal: No clubbing, no deformities  Neuro: A&Ox3, speech clear, gait stable, cooperative, no focal neurologic deficits         DIAGNOSTIC DATA     1. Echocardiogram   5/22/15 - 45-55% LAE, mild to moderate MR  5/26/16- EF 60%, MR/TR/IA mild  1/9/19- EF 60%, AI mild    2. Cholesterol profile   4/24/15 - , HDL 45, ,   7/24/15 - , HDL 34, LDL 69,   1/26/16 - , HDL 47, ,   3/12/17- , HDL 42,   12/1/17- , HDL 41, , Tg 211  8/8/18- , HDL 47, ,   2/14/19- , HDL 42, ,   7/3/19- , HDL 35, ,     3. Carotid duplex/Vascular studies:   4/24/15 - Right 0-49%, Left 0-49%  8/2/17- L/R 0-9%    4. Cardiac MRI  8/4/15   1. Patient is status post interposition tube graft for ascending aortic   dissection. The ascending aorta tube graft extends from the sinotubular   junction to the aortic arch. In the mid section of the tube graft there is a   kink without limitation of the flow. Proximally the ascending aortic tube   graft measures 27 mm. In the mid section where theere is a kink the tube   graft measures 33 mm and distally it measures 25 mm prior to termination in   the aortic arch. There is no perigraft collection. Normal sinus of Valsalva. The aortic arch and proximal descending thoracic aorta is mildly dilated at   35 mm. 2. There is residual dissection extending from the level of the left common   carotid artery and the aortic arch down to the descending thoracic aorta and   abdominal aorta aorta and extending into the left common iliac and   terminating in the proximal portion of the left external iliac artery. The   right common iliac and the left internal iliac artery are free of   dissection. As expected the false lumen is larger than the true lumen. The   celiac trunk and SMA takeoff is from the true lumen. Both renal arteries   appeared to originate from the false lumen. There is no aneurysm of the   descending thoracic aorta or abdominal aorta. 3. Normal trileaflet aortic valve. No significant aortic regurgitation. 4. Mild 1+ mitral regurgitation. 5. Mild 1+ tricuspid regurgitation.   6. Normal left ventricular size and systolic function. LVEF 60%. 7. Normal right ventricular size and systolic function. RVEF 55%. 8. Normal pleura and pericardium. There is no significant effusions. 9. No intravenous contrast was used for the study. 9/4/15  1. Postoperative changes ascending aorta. Small kink within the graft   unchanged  2. Caliber of the descending thoracic aorta and abdominal aorta unchanged. 3. No paracardial effusion  4. Slow the flow versus partial thrombosis of the proximal descending   thoracic aorta false lumen    3/8/16  1. No interval change in postoperative findings from a descending aorta   dissection repair and associated appearance and size of the ace ascending   and descending aorta. 2. No change in the appearance of the residual descending aortic dissection   with true and false lumens. 5. CT Chest  7/10/18-Status post thoracic aortic repair. No evidence of a sending or descending  thoracic aortic aneurysm. No acute intrathoracic process.     6. Stress Test  Lexiscan- 1/9/19- no ischemia, EF 77%         LABORATORY DATA            Lab Results   Component Value Date/Time    WBC 11.1 (H) 09/04/2015 12:20 PM    Hemoglobin (POC) 13.3 09/04/2015 12:32 PM    HGB 11.8 09/04/2015 12:20 PM    Hematocrit (POC) 39 09/04/2015 12:32 PM    HCT 37.8 09/04/2015 12:20 PM    PLATELET 494 06/88/0357 12:20 PM    MCV 79.4 (L) 09/04/2015 12:20 PM      Lab Results   Component Value Date/Time    Sodium 137 05/06/2015 04:57 AM    Potassium 3.6 05/06/2015 04:57 AM    Chloride 99 05/06/2015 04:57 AM    CO2 31 05/06/2015 04:57 AM    Anion gap 7 05/06/2015 04:57 AM    Glucose 109 (H) 05/06/2015 04:57 AM    BUN 14 05/06/2015 04:57 AM    Creatinine 0.58 05/06/2015 04:57 AM    BUN/Creatinine ratio 24 (H) 05/06/2015 04:57 AM    GFR est AA >60 05/06/2015 04:57 AM    GFR est non-AA >60 05/06/2015 04:57 AM    Calcium 8.7 05/06/2015 04:57 AM    Bilirubin, total 0.3 02/14/2019 08:10 AM    AST (SGOT) 15 02/14/2019 08:10 AM Alk. phosphatase 77 02/14/2019 08:10 AM    Protein, total 6.4 02/14/2019 08:10 AM    Albumin 4.1 02/14/2019 08:10 AM    Globulin 4.0 09/04/2015 12:20 PM    A-G Ratio 1.0 (L) 09/04/2015 12:20 PM    ALT (SGPT) 22 02/14/2019 08:10 AM           ASSESSMENT/RECOMMENDATIONS:.      1. Aortic aneurysm repair  - Last CT Dec 2019, some improvement in aortic false lumen, totally resolved. - She does have a chronic dissection extending from her diaphragm down to her illiacs. 2. Dyslipidemia  - LDL goal should be under 100  - Will obtain labs from Karina Cantrell MD   - I encouraged her to work on risk factor modification, including eating healthy and clean and exercising     3. Family history of carotid disease    Return in 6 months or PRN. Orders Placed This Encounter    AMB POC EKG ROUTINE W/ 12 LEADS, INTER & REP     Order Specific Question:   Reason for Exam:     Answer:   chol        Follow-up and Dispositions  ·   Return in about 6 months (around 9/12/2020). I have discussed the diagnosis with  Prabha García and the intended plan as seen in the above orders. Questions were answered concerning future plans. I have discussed medication side effects and warnings with the patient as well. Thank you,  Karina Cantrell MD for involving me in the care of  Prabha García. Please do not hesitate to contact me for further questions/concerns. Written by Geri Mora, as dictated by Raoul Martinez MD.      India Mari MD, [facility] [address]      [facility], [address] [place] [address] Drive      [phone] / [phone] Fax

## 2020-03-12 NOTE — PROGRESS NOTES
Jake King is a 61 y.o. female    Chief Complaint   Patient presents with    Cholesterol Problem    Other     AS       Chest pain no  SOB no  Dizziness no  Swelling no  Recent hospital visit no  Refills no  Visit Vitals  /80 (BP 1 Location: Left arm, BP Patient Position: Sitting)   Pulse 77   Ht 5' 1\" (1.549 m)   Wt 185 lb (83.9 kg)   SpO2 98%   BMI 34.96 kg/m²       Last lipids 7/19    No issues today.

## 2020-06-15 RX ORDER — PRAVASTATIN SODIUM 20 MG/1
TABLET ORAL
Qty: 90 TAB | Refills: 3 | Status: SHIPPED | OUTPATIENT
Start: 2020-06-15 | End: 2020-10-02 | Stop reason: SDUPTHER

## 2020-10-05 RX ORDER — PRAVASTATIN SODIUM 20 MG/1
20 TABLET ORAL
Qty: 90 TAB | Refills: 0 | Status: SHIPPED | OUTPATIENT
Start: 2020-10-05 | End: 2021-03-23

## 2020-10-05 RX ORDER — METOPROLOL TARTRATE 50 MG/1
TABLET ORAL
Qty: 180 TAB | Refills: 0 | Status: SHIPPED | OUTPATIENT
Start: 2020-10-05 | End: 2021-01-12 | Stop reason: SDUPTHER

## 2020-10-05 RX ORDER — LISINOPRIL 5 MG/1
5 TABLET ORAL DAILY
Qty: 90 TAB | Refills: 0 | Status: SHIPPED | OUTPATIENT
Start: 2020-10-05 | End: 2020-12-18

## 2020-10-07 NOTE — PROGRESS NOTES
LAST OFFICE VISIT : 8/29/19       ATTENTION:   This medical record was transcribed using an electronic medical records/speech recognition system. Although proofread, it may and can contain electronic, spelling and other errors. Corrections may be executed at a later time. Please feel free to contact us for any clarifications as needed. ICD-10-CM ICD-9-CM   1. Ascending aortic dissection (HCC)  I71.01 441.01   2. Dyslipidemia  E78.5 272.4   3. Carotid stenosis, bilateral  I65.23 433.10     433.30        Stephen Brownlee is a 61 y.o. female with dyslipidemia aortic aneurysm was in 2019. Sai Green Last seen by me 6 months ago. Cardiac risk factors: family history, dyslipidemia, obesity, sedentary life style, postmenopausal  I have personally obtained the history from the patient. HISTORY OF PRESENTING ILLNESS     Stephen Brownlee is doing well since I last saw her. Her last MRI of her chest for her aortic aneurysm was in 2019. She has intermittent episodes of chest discomfort but it is atypical and this is been ongoing for years. I presume she still using a mouth appliance for her sleep apnea. Her blood pressure slightly elevated but at home she says is usually systolics of 601. She has her cholesterol checked by her primary care. Weight is consistently the same. She does tell me today that she has her first grandchild and his name is [address] [place] [address].        ACTIVE PROBLEM LIST     Patient Active Problem List    Diagnosis Date Noted    Severe obesity ([address] 08/29/2019    Aortic stenosis 04/29/2015    Postoperative anemia due to acute blood loss 04/27/2015    Ascending aortic dissection (HCC) 04/25/2015    Aortic dissection (HCC) 04/24/2015    Chest pain 04/23/2015    Hypercholesterolemia     Classical migraine     Anxiety and depression            PAST MEDICAL HISTORY     Past Medical History:   Diagnosis Date    Acquired absence of both cervix and uterus     Anxiety and depression     Ascending aortic dissection ([facility] [address] 4/25/2015    Re suspension of aortic valve and 26 mm Hemashield tube graft; circulatory arrest    Classical migraine     Essential hypertension     Fibroids     Hypercholesterolemia     Routine Papanicolaou smear 2009 neg           PAST SURGICAL HISTORY     Past Surgical History:   Procedure Laterality Date    HX MASTOPEXY (BREAST LIFT)  09/2006    HX TONSILLECTOMY      HX TOTAL VAGINAL HYSTERECTOMY  2003    NM EVASC RPR AAA PSEUDOARYSM ABDL AORTA VISC RS&I  04/25/15    Aortic Valve Repair          ALLERGIES     Allergies   Allergen Reactions    Contrast Agent [Iodine] Other (comments)     Happened as a child but was sent to hospital for treatment. FAMILY HISTORY     Family History   Problem Relation Age of Onset    Other Father         Aneurysm    Heart Disease Father     Other Sister         Aneurysm    Heart Disease Mother     Heart Disease Sister     Heart Disease Maternal Grandfather     Heart Attack Maternal Grandfather     Heart Attack Sister     negative for cardiac disease       SOCIAL HISTORY     Social History     Socioeconomic History    Marital status:      Spouse name: Not on file    Number of children: Not on file    Years of education: Not on file    Highest education level: Not on file   Tobacco Use    Smoking status: Never Smoker    Smokeless tobacco: Never Used   Substance and Sexual Activity    Alcohol use: No     Alcohol/week: 0.0 standard drinks    Drug use: No    Sexual activity: Yes     Partners: Male     Birth control/protection: Surgical     Comment: Hysterectomy         MEDICATIONS     Current Outpatient Medications   Medication Sig    metoprolol tartrate (LOPRESSOR) 50 mg tablet TAKE 1 TABLET BY MOUTH TWICE A DAY    lisinopriL (PRINIVIL, ZESTRIL) 5 mg tablet Take 1 Tab by mouth daily.  pravastatin (PRAVACHOL) 20 mg tablet Take 1 Tab by mouth nightly.  DULoxetine (CYMBALTA) 60 mg capsule Take 60 mg by mouth daily.     aspirin delayed-release 81 mg tablet Take  by mouth daily.  co-enzyme Q-10 (CO Q-10) 100 mg capsule Take 100 mg by mouth daily.  ascorbic acid (VITAMIN C) 500 mg tablet Take  by mouth.  Cholecalciferol, Vitamin D3, (VITAMIN D3) 1,000 unit cap Take  by mouth.  multivitamin (ONE A DAY) tablet Take 1 Tab by mouth daily.  ibuprofen (MOTRIN) 200 mg tablet Take  by mouth daily as needed for Pain.  gabapentin (NEURONTIN) 100 mg capsule 200 mg daily.  omeprazole (PRILOSEC) 40 mg capsule Take 40 mg by mouth Daily (before breakfast). No current facility-administered medications for this visit. I have reviewed the nurses notes, vitals, problem list, allergy list, medical history, family, social history and medications. REVIEW OF SYMPTOMS      General: Pt denies excessive weight gain or loss. Pt is able to conduct ADL's  HEENT: Denies blurred vision, headaches, hearing loss, epistaxis and difficulty swallowing. Respiratory: Denies cough, congestion, shortness of breath, CUELLO, wheezing or stridor. Cardiovascular: Denies precordial pain, palpitations, edema or PND  Gastrointestinal: Denies poor appetite, indigestion, abdominal pain or blood in stool  Genitourinary: Denies hematuria, dysuria, increased urinary frequency  Musculoskeletal: Denies joint pain or swelling from muscles or joints  Neurologic: Denies tremor, paresthesias, headache, or sensory motor disturbance  Psychiatric: Denies confusion, insomnia, depression  Integumentray: Denies rash, itching or ulcers. Hematologic: Denies easy bruising, bleeding     PHYSICAL EXAMINATION      Vitals:    10/08/20 0859   BP: (!) 140/80   Pulse: 68   Weight: 182 lb (82.6 kg)   Height: 5' 1\" (1.549 m)     General: Well developed, in no acute distress.   HEENT: No jaundice, oral mucosa moist, no oral ulcers  Neck: Supple, no stiffness, no lymphadenopathy, supple  Heart: 2/6 systolic murmur at the RUSB unchanged  Respiratory: Clear bilaterally x 4, no wheezing or rales  Extremities:  No edema, normal cap refill, no cyanosis. Musculoskeletal: No clubbing, no deformities  Neuro: A&Ox3, speech clear, gait stable, cooperative, no focal neurologic deficits         DIAGNOSTIC DATA     1. Echocardiogram   5/22/15 - 45-55% LAE, mild to moderate MR  5/26/16- EF 60%, MR/TR/TN mild  1/9/19- EF 60%, AI mild    2. Cholesterol profile   4/24/15 - , HDL 45, ,   7/24/15 - , HDL 34, LDL 69,   1/26/16 - , HDL 47, ,   3/12/17- , HDL 42,   12/1/17- , HDL 41, , Tg 211  8/8/18- , HDL 47, ,   2/14/19- , HDL 42, ,   7/3/19- , HDL 35, ,   1/7/20- , HDL 36, ,   7/31/20- , HDL 37, ,     3. Carotid duplex/Vascular studies:   4/24/15 - Right 0-49%, Left 0-49%  8/2/17- L/R 0-9%    4. Cardiac MRI  8/4/15   1. Patient is status post interposition tube graft for ascending aortic   dissection. The ascending aorta tube graft extends from the sinotubular   junction to the aortic arch. In the mid section of the tube graft there is a   kink without limitation of the flow. Proximally the ascending aortic tube   graft measures 27 mm. In the mid section where theere is a kink the tube   graft measures 33 mm and distally it measures 25 mm prior to termination in   the aortic arch. There is no perigraft collection. Normal sinus of Valsalva. The aortic arch and proximal descending thoracic aorta is mildly dilated at   35 mm. 2. There is residual dissection extending from the level of the left common   carotid artery and the aortic arch down to the descending thoracic aorta and   abdominal aorta aorta and extending into the left common iliac and   terminating in the proximal portion of the left external iliac artery. The   right common iliac and the left internal iliac artery are free of   dissection.  As expected the false lumen is larger than the true lumen. The   celiac trunk and SMA takeoff is from the true lumen. Both renal arteries   appeared to originate from the false lumen. There is no aneurysm of the   descending thoracic aorta or abdominal aorta. 3. Normal trileaflet aortic valve. No significant aortic regurgitation. 4. Mild 1+ mitral regurgitation. 5. Mild 1+ tricuspid regurgitation. 6. Normal left ventricular size and systolic function. LVEF 60%. 7. Normal right ventricular size and systolic function. RVEF 55%. 8. Normal pleura and pericardium. There is no significant effusions. 9. No intravenous contrast was used for the study. 9/4/15  1. Postoperative changes ascending aorta. Small kink within the graft   unchanged  2. Caliber of the descending thoracic aorta and abdominal aorta unchanged. 3. No paracardial effusion  4. Slow the flow versus partial thrombosis of the proximal descending   thoracic aorta false lumen    3/8/16  1. No interval change in postoperative findings from a descending aorta   dissection repair and associated appearance and size of the ace ascending   and descending aorta. 2. No change in the appearance of the residual descending aortic dissection   with true and false lumens. 5. CT Chest  7/10/18-Status post thoracic aortic repair. No evidence of a sending or descending  thoracic aortic aneurysm. No acute intrathoracic process.     6. Stress Test  Lexiscan- 1/9/19- no ischemia, EF 77%         LABORATORY DATA            Lab Results   Component Value Date/Time    WBC 11.1 (H) 09/04/2015 12:20 PM    Hemoglobin (POC) 13.3 09/04/2015 12:32 PM    HGB 11.8 09/04/2015 12:20 PM    Hematocrit (POC) 39 09/04/2015 12:32 PM    HCT 37.8 09/04/2015 12:20 PM    PLATELET 860 32/97/7726 12:20 PM    MCV 79.4 (L) 09/04/2015 12:20 PM      Lab Results   Component Value Date/Time    Sodium 137 05/06/2015 04:57 AM    Potassium 3.6 05/06/2015 04:57 AM    Chloride 99 05/06/2015 04:57 AM    CO2 31 05/06/2015 04:57 AM Anion gap 7 05/06/2015 04:57 AM    Glucose 109 (H) 05/06/2015 04:57 AM    BUN 14 05/06/2015 04:57 AM    Creatinine 0.58 05/06/2015 04:57 AM    BUN/Creatinine ratio 24 (H) 05/06/2015 04:57 AM    GFR est AA >60 05/06/2015 04:57 AM    GFR est non-AA >60 05/06/2015 04:57 AM    Calcium 8.7 05/06/2015 04:57 AM    Bilirubin, total 0.3 02/14/2019 08:10 AM    Alk. phosphatase 77 02/14/2019 08:10 AM    Protein, total 6.4 02/14/2019 08:10 AM    Albumin 4.1 02/14/2019 08:10 AM    Globulin 4.0 09/04/2015 12:20 PM    A-G Ratio 1.0 (L) 09/04/2015 12:20 PM    ALT (SGPT) 22 02/14/2019 08:10 AM           ASSESSMENT/RECOMMENDATIONS:.      1. Aortic aneurysm repair  -Last MRI was in 2019  -Repeat MRI in 2021  -Blood pressure systolic should be in the 130 or less range. 2. Dyslipidemia  - LDL is only slightly elevated at 106 continue eating healthy and clean and reduce red meat intake  3. Hypertension  -Systolic blood pressure slightly elevated so she will follow her blood pressures over next several weeks and call me with her numbers they remain elevated we will need to adjust her lisinopril up to 10.    4. Family history of carotid disease    Return in 6 months or PRN. No orders of the defined types were placed in this encounter. Follow-up and Dispositions  ·   Return in about 6 months (around 4/8/2021). I have discussed the diagnosis with  Liss Holden and the intended plan as seen in the above orders. Questions were answered concerning future plans. I have discussed medication side effects and warnings with the patient as well. Thank you,  Jesi Mejía MD for involving me in the care of  Liss Holden. Please do not hesitate to contact me for further questions/concerns. Iglesia Mari MD, [address] [place] [address] Rd., [address]      [facility], [address] [place] [address]      [phone] / [phone] Fax

## 2020-10-08 ENCOUNTER — OFFICE VISIT (OUTPATIENT)
Dept: CARDIOLOGY CLINIC | Age: 60
End: 2020-10-08
Payer: COMMERCIAL

## 2020-10-08 VITALS
HEIGHT: 61 IN | DIASTOLIC BLOOD PRESSURE: 80 MMHG | SYSTOLIC BLOOD PRESSURE: 140 MMHG | HEART RATE: 68 BPM | BODY MASS INDEX: 34.36 KG/M2 | WEIGHT: 182 LBS

## 2020-10-08 DIAGNOSIS — I65.23 CAROTID STENOSIS, BILATERAL: ICD-10-CM

## 2020-10-08 DIAGNOSIS — I71.010 ASCENDING AORTIC DISSECTION: Primary | ICD-10-CM

## 2020-10-08 DIAGNOSIS — E78.5 DYSLIPIDEMIA: ICD-10-CM

## 2020-10-08 PROCEDURE — 99214 OFFICE O/P EST MOD 30 MIN: CPT | Performed by: SPECIALIST

## 2020-10-08 NOTE — PROGRESS NOTES
Visit Vitals  BP (!) 140/80   Pulse 68   Ht 5' 1\" (1.549 m)   Wt 182 lb (82.6 kg)   BMI 34.39 kg/m²

## 2020-11-09 NOTE — PATIENT INSTRUCTIONS
Well Visit, Women 48 to 72: Care Instructions Your Care Instructions Physical exams can help you stay healthy. Your doctor has checked your overall health and may have suggested ways to take good care of yourself. He or she also may have recommended tests. At home, you can help prevent illness with healthy eating, regular exercise, and other steps. Follow-up care is a key part of your treatment and safety. Be sure to make and go to all appointments, and call your doctor if you are having problems. It's also a good idea to know your test results and keep a list of the medicines you take. How can you care for yourself at home? · Reach and stay at a healthy weight. This will lower your risk for many problems, such as obesity, diabetes, heart disease, and high blood pressure. · Get at least 30 minutes of exercise on most days of the week. Walking is a good choice. You also may want to do other activities, such as running, swimming, cycling, or playing tennis or team sports. · Do not smoke. Smoking can make health problems worse. If you need help quitting, talk to your doctor about stop-smoking programs and medicines. These can increase your chances of quitting for good. · Protect your skin from too much sun. When you're outdoors from 10 a.m. to 4 p.m., stay in the shade or cover up with clothing and a hat with a wide brim. Wear sunglasses that block UV rays. Even when it's cloudy, put broad-spectrum sunscreen (SPF 30 or higher) on any exposed skin. · See a dentist one or two times a year for checkups and to have your teeth cleaned. · Wear a seat belt in the car. Follow your doctor's advice about when to have certain tests. These tests can spot problems early. · Cholesterol. Your doctor will tell you how often to have this done based on your age, family history, or other things that can increase your risk for heart attack and stroke. · Blood pressure. Have your blood pressure checked during a routine doctor visit. Your doctor will tell you how often to check your blood pressure based on your age, your blood pressure results, and other factors. · Mammogram. Ask your doctor how often you should have a mammogram, which is an X-ray of your breasts. A mammogram can spot breast cancer before it can be felt and when it is easiest to treat. · Pap test and pelvic exam. Ask your doctor how often you should have a Pap test. You may not need to have a Pap test as often as you used to. · Vision. Have your eyes checked every year or two or as often as your doctor suggests. Some experts recommend that you have yearly exams for glaucoma and other age-related eye problems starting at age 48. · Hearing. Tell your doctor if you notice any change in your hearing. You can have tests to find out how well you hear. · Diabetes. Ask your doctor whether you should have tests for diabetes. · Colorectal cancer. Your risk for colorectal cancer gets higher as you get older. Some experts say that adults should start regular screening at age 48 and stop at age 76. Others say to start before age 48 or continue after age 76. Talk with your doctor about your risk and when to start and stop screening. · Thyroid disease. Talk to your doctor about whether to have your thyroid checked as part of a regular physical exam. Women have an increased chance of a thyroid problem. · Osteoporosis. You should begin tests for bone density at age 72. If you are younger than 72, ask your doctor whether you have factors that may increase your risk for this disease. You may want to have this test before age 72. · Heart attack and stroke risk. At least every 4 to 6 years, you should have your risk for heart attack and stroke assessed.  Your doctor uses factors such as your age, blood pressure, cholesterol, and whether you smoke or have diabetes to show what your risk for a heart attack or stroke is over the next 10 years. When should you call for help? Watch closely for changes in your health, and be sure to contact your doctor if you have any problems or symptoms that concern you. Where can you learn more? Go to http://www.gray.com/ Enter V889 in the search box to learn more about \"Well Visit, Women 50 to 72: Care Instructions. \" Current as of: May 27, 2020               Content Version: 12.6 © 4250-8471 [employer]. Care instructions adapted under license by [employer] (which disclaims liability or warranty for this information). If you have questions about a medical condition or this instruction, always ask your healthcare professional. [address] any warranty or liability for your use of this information.

## 2020-11-09 NOTE — PROGRESS NOTES
Annual exam ages 40-58 post hysterectomy      Johnathon Contreras is a ,  61 y.o. female   No LMP recorded. Patient has had a hysterectomy. She presents for her annual checkup. She is having no significant problems. With regard to the Gardasil vaccine, she is older than the FDA approved age to receive it. Hormonal status:  She reports no perimenstrual type symptoms. She is not having vasomotor symptoms. The patient is not using any ERT. Sexual history:    She  reports being sexually active and has had partner(s) who are Male. She reports using the following method of birth control/protection: Surgical.    Medical conditions:    Since her last annual GYN exam about 1 1/2 years ago, she has not the following changes in her health history: none. Surgical history confirmed with patient. has a past surgical history that includes hx mastopexy (breast lift) (2006); hx tonsillectomy; pr evasc rpr aaa pseudoarysm abdl aorta visc rs&i (04/25/15); and hx total vaginal hysterectomy (). Pap and Mammogram History:    Her most recent Pap smear was normal, obtained 10 year(s) ago. .    The patient had her mammogram today in our office. Breast Cancer History/Substance Abuse: negative    Osteoporosis History:    Family history does not include a first or second degree relative with osteopenia or osteoporosis. A bone density scan has not been obtained. She is currently takingvit D.     Past Medical History:   Diagnosis Date    Acquired absence of both cervix and uterus     Anxiety and depression     Ascending aortic dissection ([address] 2015    Re suspension of aortic valve and 26 mm Hemashield tube graft; circulatory arrest    Classical migraine     Essential hypertension     Fibroids     Hypercholesterolemia     Routine Papanicolaou smear  neg     Past Surgical History:   Procedure Laterality Date    HX MASTOPEXY (BREAST LIFT)  2006    HX TONSILLECTOMY      HX TOTAL VAGINAL HYSTERECTOMY  2003    OR EVASC RPR AAA PSEUDOARYSM ABDL AORTA VISC RS&I  04/25/15    Aortic Valve Repair       Current Outpatient Medications   Medication Sig Dispense Refill    metoprolol tartrate (LOPRESSOR) 50 mg tablet TAKE 1 TABLET BY MOUTH TWICE A  Tab 0    lisinopriL (PRINIVIL, ZESTRIL) 5 mg tablet Take 1 Tab by mouth daily. 90 Tab 0    pravastatin (PRAVACHOL) 20 mg tablet Take 1 Tab by mouth nightly. 90 Tab 0    DULoxetine (CYMBALTA) 60 mg capsule Take 60 mg by mouth daily.  aspirin delayed-release 81 mg tablet Take  by mouth daily.  co-enzyme Q-10 (CO Q-10) 100 mg capsule Take 100 mg by mouth daily.  ascorbic acid (VITAMIN C) 500 mg tablet Take  by mouth.  Cholecalciferol, Vitamin D3, (VITAMIN D3) 1,000 unit cap Take  by mouth.  multivitamin (ONE A DAY) tablet Take 1 Tab by mouth daily.  ibuprofen (MOTRIN) 200 mg tablet Take  by mouth daily as needed for Pain.  gabapentin (NEURONTIN) 100 mg capsule 200 mg daily.  omeprazole (PRILOSEC) 40 mg capsule Take 40 mg by mouth Daily (before breakfast). Allergies: Contrast agent [iodine]     Tobacco History:  reports that she has never smoked. She has never used smokeless tobacco.  Alcohol Abuse:  reports no history of alcohol use. Drug Abuse:  reports no history of drug use.     Family Medical/Cancer History:   Family History   Problem Relation Age of Onset    Other Father         Aneurysm    Heart Disease Father     Other Sister         Aneurysm    Heart Disease Mother     Heart Disease Sister     Heart Disease Maternal Grandfather     Heart Attack Maternal Grandfather     Heart Attack Sister         Review of Systems - History obtained from the patient  Constitutional: negative for weight loss, fever, night sweats  HEENT: negative for hearing loss, earache, congestion, snoring, sorethroat  CV: negative for chest pain, palpitations, edema  Resp: negative for cough, shortness of breath, wheezing  GI: negative for change in bowel habits, abdominal pain, black or bloody stools  : negative for frequency, dysuria, hematuria, vaginal discharge  MSK: negative for back pain, joint pain, muscle pain  Breast: negative for breast lumps, nipple discharge, galactorrhea  Skin :negative for itching, rash, hives  Neuro: negative for dizziness, headache, confusion, weakness  Psych: negative for anxiety, depression, change in mood  Heme/lymph: negative for bleeding, bruising, pallor    Physical Exam    Visit Vitals  BP (!) 106/50   Ht 5' 1\" (1.549 m)   Wt 185 lb (83.9 kg)   BMI 34.96 kg/m²     Constitutional  · Appearance: well-nourished, well developed, alert, in no acute distress    HENT  · Head and Face: appears normal    Neck  · Inspection/Palpation: normal appearance, no masses or tenderness  · Lymph Nodes: no lymphadenopathy present  · Thyroid: gland size normal, nontender, no nodules or masses present on palpation    Chest  · Respiratory Effort: breathing unlabored  · Auscultation: normal breath sounds    Cardiovascular  · Heart:  · Auscultation: regular rate and rhythm without murmur    Breasts  · Inspection of Breasts: breasts symmetrical, no skin changes, no discharge present, nipple appearance normal, no skin retraction present  · Palpation of Breasts and Axillae: no masses present on palpation, no breast tenderness  · Axillary Lymph Nodes: no lymphadenopathy present    Gastrointestinal  · Abdominal Examination: abdomen non-tender to palpation, normal bowel sounds, no masses present  · Liver and spleen: no hepatomegaly present, spleen not palpable  · Hernias: no hernias identified    Genitourinary  · External Genitalia: normal appearance for age, no discharge present, no tenderness present, no inflammatory lesions present, no masses present, no atrophy present  · Vagina: normal vaginal vault without central or paravaginal defects, no discharge present, no inflammatory lesions present, no masses present  · Bladder: non-tender to palpation  · Urethra: appears normal  · Cervix: absent  · Uterus: absent  · Adnexa: no adnexal tenderness present, no adnexal masses present  · Perineum: perineum within normal limits, no evidence of trauma, no rashes or skin lesions present  · Anus: anus within normal limits, no hemorrhoids present  · Inguinal Lymph Nodes: no lymphadenopathy present    Skin  · General Inspection: no rash, no lesions identified    Neurologic/Psychiatric  · Mental Status:  · Orientation: grossly oriented to person, place and time  · Mood and Affect: mood normal, affect appropriate    Assessment:  Routine gynecologic examination  Her current medical status is satisfactory with no evidence of significant gynecologic issues.     Plan:  Counseled re: diet, exercise, healthy lifestyle  Return for yearly wellness visits  Rec annual mammogram

## 2020-11-11 ENCOUNTER — OFFICE VISIT (OUTPATIENT)
Dept: OBGYN CLINIC | Age: 60
End: 2020-11-11
Payer: COMMERCIAL

## 2020-11-11 VITALS
DIASTOLIC BLOOD PRESSURE: 50 MMHG | BODY MASS INDEX: 34.93 KG/M2 | HEIGHT: 61 IN | WEIGHT: 185 LBS | SYSTOLIC BLOOD PRESSURE: 106 MMHG

## 2020-11-11 DIAGNOSIS — Z01.419 ENCOUNTER FOR GYNECOLOGICAL EXAMINATION WITHOUT ABNORMAL FINDING: Primary | ICD-10-CM

## 2020-11-11 PROCEDURE — 99396 PREV VISIT EST AGE 40-64: CPT | Performed by: OBSTETRICS & GYNECOLOGY

## 2020-12-18 RX ORDER — LISINOPRIL 5 MG/1
TABLET ORAL
Qty: 90 TAB | Refills: 2 | Status: SHIPPED | OUTPATIENT
Start: 2020-12-18 | End: 2021-07-27

## 2021-01-12 RX ORDER — METOPROLOL TARTRATE 50 MG/1
TABLET ORAL
Qty: 180 TAB | Refills: 3 | Status: SHIPPED | OUTPATIENT
Start: 2021-01-12 | End: 2021-11-11

## 2021-03-23 RX ORDER — PRAVASTATIN SODIUM 20 MG/1
TABLET ORAL
Qty: 90 TAB | Refills: 0 | Status: SHIPPED | OUTPATIENT
Start: 2021-03-23 | End: 2021-04-15 | Stop reason: ALTCHOICE

## 2021-03-23 NOTE — TELEPHONE ENCOUNTER
Requested Prescriptions     Signed Prescriptions Disp Refills    pravastatin (PRAVACHOL) 20 mg tablet 90 Tab 0     Sig: TAKE 1 TABLET BY MOUTH EVERY DAY AT NIGHT     Authorizing Provider: Reva Thomson     Ordering User: Debby Cook     Refill per verbal order .

## 2021-04-14 NOTE — PROGRESS NOTES
LAST OFFICE VISIT : 8/29/19       ATTENTION:   This medical record was transcribed using an electronic medical records/speech recognition system. Although proofread, it may and can contain electronic, spelling and other errors. Corrections may be executed at a later time. Please feel free to contact us for any clarifications as needed. ICD-10-CM ICD-9-CM   1. Ascending aortic dissection (HCC)  I71.01 441.01   2. Dyslipidemia  E78.5 272.4   3. Aortic valve stenosis, etiology of cardiac valve disease unspecified  I35.0 424.1        Lily Barnes is a 61 y.o. female with dyslipidemia aortic aneurysm was in 2019. Macielclaudiala Goodpasture Last seen by me 6 months ago. Cardiac risk factors: family history, dyslipidemia, obesity, sedentary life style, postmenopausal  I have personally obtained the history from the patient. HISTORY OF PRESENTING ILLNESS     Lily Barnes has no interval cardiac complaints. Is been doing well overall. No chest pain shortness of breath.   Weights were relatively stable       ACTIVE PROBLEM LIST     Patient Active Problem List    Diagnosis Date Noted    Severe obesity ([address] 08/29/2019    Aortic stenosis 04/29/2015    Postoperative anemia due to acute blood loss 04/27/2015    Ascending aortic dissection (HCC) 04/25/2015    Aortic dissection (HCC) 04/24/2015    Chest pain 04/23/2015    Hypercholesterolemia     Classical migraine     Anxiety and depression            PAST MEDICAL HISTORY     Past Medical History:   Diagnosis Date    Acquired absence of both cervix and uterus     Anxiety and depression     Ascending aortic dissection ([address] 4/25/2015    Re suspension of aortic valve and 26 mm Hemashield tube graft; circulatory arrest    Classical migraine     Essential hypertension     Fibroids     Hypercholesterolemia     Routine Papanicolaou smear 2009 neg           PAST SURGICAL HISTORY     Past Surgical History:   Procedure Laterality Date    HX MASTOPEXY (BREAST LIFT)  09/2006  HX TONSILLECTOMY      HX TOTAL VAGINAL HYSTERECTOMY  2003    RI EVASC RPR AAA PSEUDOARYSM ABDL AORTA VISC RS&I  04/25/15    Aortic Valve Repair          ALLERGIES     Allergies   Allergen Reactions    Contrast Agent [Iodine] Other (comments)     Happened as a child but was sent to hospital for treatment. FAMILY HISTORY     Family History   Problem Relation Age of Onset    Other Father         Aneurysm    Heart Disease Father     Other Sister         Aneurysm    Heart Disease Mother     Heart Disease Sister     Heart Disease Maternal Grandfather     Heart Attack Maternal Grandfather     Heart Attack Sister     negative for cardiac disease       SOCIAL HISTORY     Social History     Socioeconomic History    Marital status:      Spouse name: Not on file    Number of children: Not on file    Years of education: Not on file    Highest education level: Not on file   Tobacco Use    Smoking status: Never Smoker    Smokeless tobacco: Never Used   Substance and Sexual Activity    Alcohol use: No     Alcohol/week: 0.0 standard drinks    Drug use: No    Sexual activity: Yes     Partners: Male     Birth control/protection: Surgical     Comment: Hysterectomy         MEDICATIONS     Current Outpatient Medications   Medication Sig    pravastatin (PRAVACHOL) 20 mg tablet TAKE 1 TABLET BY MOUTH EVERY DAY AT NIGHT    metoprolol tartrate (LOPRESSOR) 50 mg tablet TAKE 1 TABLET BY MOUTH TWICE A DAY    lisinopriL (PRINIVIL, ZESTRIL) 5 mg tablet TAKE 1 TABLET BY MOUTH EVERY DAY    DULoxetine (CYMBALTA) 60 mg capsule Take 60 mg by mouth daily.  aspirin delayed-release 81 mg tablet Take  by mouth daily.  co-enzyme Q-10 (CO Q-10) 100 mg capsule Take 100 mg by mouth daily.  ascorbic acid (VITAMIN C) 500 mg tablet Take  by mouth.  Cholecalciferol, Vitamin D3, (VITAMIN D3) 1,000 unit cap Take  by mouth.  multivitamin (ONE A DAY) tablet Take 1 Tab by mouth daily.     ibuprofen (MOTRIN) 200 mg tablet Take  by mouth daily as needed for Pain.  gabapentin (NEURONTIN) 100 mg capsule 200 mg daily.  omeprazole (PRILOSEC) 40 mg capsule Take 40 mg by mouth Daily (before breakfast). No current facility-administered medications for this visit. I have reviewed the nurses notes, vitals, problem list, allergy list, medical history, family, social history and medications. REVIEW OF SYMPTOMS   Pertinent positive per HPI  General: Pt denies excessive weight gain or loss. Pt is able to conduct ADL's  HEENT: Denies blurred vision, headaches, hearing loss, epistaxis and difficulty swallowing. Respiratory: Denies cough, congestion, shortness of breath, CUELLO, wheezing or stridor. Cardiovascular: Denies precordial pain, palpitations, edema or PND  Gastrointestinal: Denies poor appetite, indigestion, abdominal pain or blood in stool  Genitourinary: Denies hematuria, dysuria, increased urinary frequency  Musculoskeletal: Denies joint pain or swelling from muscles or joints  Neurologic: Denies tremor, paresthesias, headache, or sensory motor disturbance  Psychiatric: Denies confusion, insomnia, depression  Integumentray: Denies rash, itching or ulcers. Hematologic: Denies easy bruising, bleeding     PHYSICAL EXAMINATION      There were no vitals filed for this visit. General: Well developed, in no acute distress. HEENT: No jaundice, oral mucosa moist, no oral ulcers  Neck: Supple, no stiffness, no lymphadenopathy, supple  Heart: 2/6 systolic murmur at the RUSB unchanged  Respiratory: Clear bilaterally x 4, no wheezing or rales  Extremities:  No edema, normal cap refill, no cyanosis. Musculoskeletal: No clubbing, no deformities  Neuro: A&Ox3, speech clear, gait stable, cooperative, no focal neurologic deficits         DIAGNOSTIC DATA     1. Echocardiogram   5/22/15 - 45-55% LAE, mild to moderate MR  5/26/16- EF 60%, MR/TR/VT mild  1/9/19- EF 60%, AI mild    2.  Cholesterol profile   4/24/15 - TC 212, HDL 45, ,   7/24/15 - , HDL 34, LDL 69,   1/26/16 - , HDL 47, ,   3/12/17- , HDL 42,   12/1/17- , HDL 41, , Tg 211  8/8/18- , HDL 47, ,   2/14/19- , HDL 42, ,   7/3/19- , HDL 35, ,   1/7/20- , HDL 36, ,   7/31/20- , HDL 37, ,     3. Carotid duplex/Vascular studies:   4/24/15 - Right 0-49%, Left 0-49%  8/2/17- L/R 0-9%    4. Cardiac MRI  8/4/15   1. Patient is status post interposition tube graft for ascending aortic   dissection. The ascending aorta tube graft extends from the sinotubular   junction to the aortic arch. In the mid section of the tube graft there is a   kink without limitation of the flow. Proximally the ascending aortic tube   graft measures 27 mm. In the mid section where theere is a kink the tube   graft measures 33 mm and distally it measures 25 mm prior to termination in   the aortic arch. There is no perigraft collection. Normal sinus of Valsalva. The aortic arch and proximal descending thoracic aorta is mildly dilated at   35 mm. 2. There is residual dissection extending from the level of the left common   carotid artery and the aortic arch down to the descending thoracic aorta and   abdominal aorta aorta and extending into the left common iliac and   terminating in the proximal portion of the left external iliac artery. The   right common iliac and the left internal iliac artery are free of   dissection. As expected the false lumen is larger than the true lumen. The   celiac trunk and SMA takeoff is from the true lumen. Both renal arteries   appeared to originate from the false lumen. There is no aneurysm of the   descending thoracic aorta or abdominal aorta. 3. Normal trileaflet aortic valve. No significant aortic regurgitation. 4. Mild 1+ mitral regurgitation. 5. Mild 1+ tricuspid regurgitation.   6. Normal left ventricular size and systolic function. LVEF 60%. 7. Normal right ventricular size and systolic function. RVEF 55%. 8. Normal pleura and pericardium. There is no significant effusions. 9. No intravenous contrast was used for the study. 9/4/15  1. Postoperative changes ascending aorta. Small kink within the graft   unchanged  2. Caliber of the descending thoracic aorta and abdominal aorta unchanged. 3. No paracardial effusion  4. Slow the flow versus partial thrombosis of the proximal descending   thoracic aorta false lumen    3/8/16  1. No interval change in postoperative findings from a descending aorta   dissection repair and associated appearance and size of the ace ascending   and descending aorta. 2. No change in the appearance of the residual descending aortic dissection   with true and false lumens. 5. CT Chest  7/10/18-Status post thoracic aortic repair. No evidence of a sending or descending  thoracic aortic aneurysm. No acute intrathoracic process.     6. Stress Test  Lexiscan- 1/9/19- no ischemia, EF 77%      ECG today normal sinus rhythm with a retinal branch block   LABORATORY DATA            Lab Results   Component Value Date/Time    WBC 11.1 (H) 09/04/2015 12:20 PM    Hemoglobin (POC) 13.3 09/04/2015 12:32 PM    HGB 11.8 09/04/2015 12:20 PM    Hematocrit (POC) 39 09/04/2015 12:32 PM    HCT 37.8 09/04/2015 12:20 PM    PLATELET 243 16/63/9399 12:20 PM    MCV 79.4 (L) 09/04/2015 12:20 PM      Lab Results   Component Value Date/Time    Sodium 137 05/06/2015 04:57 AM    Potassium 3.6 05/06/2015 04:57 AM    Chloride 99 05/06/2015 04:57 AM    CO2 31 05/06/2015 04:57 AM    Anion gap 7 05/06/2015 04:57 AM    Glucose 109 (H) 05/06/2015 04:57 AM    BUN 14 05/06/2015 04:57 AM    Creatinine 0.58 05/06/2015 04:57 AM    BUN/Creatinine ratio 24 (H) 05/06/2015 04:57 AM    GFR est AA >60 05/06/2015 04:57 AM    GFR est non-AA >60 05/06/2015 04:57 AM    Calcium 8.7 05/06/2015 04:57 AM    Bilirubin, total 0.3 02/14/2019 08:10 AM    Alk. phosphatase 77 02/14/2019 08:10 AM    Protein, total 6.4 02/14/2019 08:10 AM    Albumin 4.1 02/14/2019 08:10 AM    Globulin 4.0 09/04/2015 12:20 PM    A-G Ratio 1.0 (L) 09/04/2015 12:20 PM    ALT (SGPT) 22 02/14/2019 08:10 AM           ASSESSMENT/RECOMMENDATIONS:.      1. Aortic aneurysm repair  -Last MRI was in 2019 and it appears as though there is no progression of disease  -Repeat MRI in 2021 we will talk with her about getting a second in the next 6 months    2. Dyslipidemia  -LDL is elevated and she is on Pravachol I favor switching her to Crestor 20 mg and will check her cholesterol again in 2 months    3. Hypertension  -Thank you she blood pressure is at goal on current medical regimen    4. Family history of carotid disease    Return in 6 months or PRN. No orders of the defined types were placed in this encounter. Follow-up and Dispositions  ·   Return in about 6 months (around 10/15/2021). I have discussed the diagnosis with  Master Louis and the intended plan as seen in the above orders. Questions were answered concerning future plans. I have discussed medication side effects and warnings with the patient as well. Thank you,  Rm Messina MD for involving me in the care of  Master Louis. Please do not hesitate to contact me for further questions/concerns. Iglesia Mari MD, [address] [place] [address] Rd[address]  [address]      [facility], [address] [place] [address]      [phone] / [phone] Fax

## 2021-04-15 ENCOUNTER — OFFICE VISIT (OUTPATIENT)
Dept: CARDIOLOGY CLINIC | Age: 61
End: 2021-04-15
Payer: COMMERCIAL

## 2021-04-15 VITALS
WEIGHT: 183.6 LBS | BODY MASS INDEX: 34.66 KG/M2 | SYSTOLIC BLOOD PRESSURE: 130 MMHG | OXYGEN SATURATION: 97 % | HEIGHT: 61 IN | DIASTOLIC BLOOD PRESSURE: 70 MMHG | HEART RATE: 72 BPM

## 2021-04-15 DIAGNOSIS — I71.010 ASCENDING AORTIC DISSECTION: Primary | ICD-10-CM

## 2021-04-15 DIAGNOSIS — E78.5 DYSLIPIDEMIA: ICD-10-CM

## 2021-04-15 DIAGNOSIS — I10 ESSENTIAL HYPERTENSION: ICD-10-CM

## 2021-04-15 DIAGNOSIS — I35.0 AORTIC VALVE STENOSIS, ETIOLOGY OF CARDIAC VALVE DISEASE UNSPECIFIED: ICD-10-CM

## 2021-04-15 PROCEDURE — 93000 ELECTROCARDIOGRAM COMPLETE: CPT | Performed by: SPECIALIST

## 2021-04-15 PROCEDURE — 99214 OFFICE O/P EST MOD 30 MIN: CPT | Performed by: SPECIALIST

## 2021-04-15 RX ORDER — CEFDINIR 300 MG/1
CAPSULE ORAL
COMMUNITY
End: 2022-01-05

## 2021-04-15 RX ORDER — ROSUVASTATIN CALCIUM 20 MG/1
20 TABLET, COATED ORAL
Qty: 30 TAB | Refills: 5 | Status: SHIPPED | OUTPATIENT
Start: 2021-04-15 | End: 2021-09-22

## 2021-04-15 NOTE — PROGRESS NOTES
Room 3    Visit Vitals  /70   Pulse 72   Ht 5' 1\" (1.549 m)   Wt 183 lb 9.6 oz (83.3 kg)   SpO2 97%   BMI 34.69 kg/m²     Chest pain: no  Shortness of breath: no  Edema: no  Palpitations: no  Dizziness: no    New diagnosis/Surgeries: no    ER/Hospitalizations: UTI x 2 [facility]    Refills: ?

## 2021-07-27 RX ORDER — LISINOPRIL 5 MG/1
TABLET ORAL
Qty: 90 TABLET | Refills: 2 | Status: SHIPPED | OUTPATIENT
Start: 2021-07-27 | End: 2022-05-25

## 2021-09-22 RX ORDER — ROSUVASTATIN CALCIUM 20 MG/1
TABLET, COATED ORAL
Qty: 90 TABLET | Refills: 1 | Status: SHIPPED | OUTPATIENT
Start: 2021-09-22 | End: 2022-04-26 | Stop reason: SDUPTHER

## 2021-10-06 ENCOUNTER — TELEPHONE (OUTPATIENT)
Dept: CARDIOLOGY CLINIC | Age: 61
End: 2021-10-06

## 2021-10-06 NOTE — TELEPHONE ENCOUNTER
Left message requesting a call back to reschedule upcoming appointment on 10/26 with Dr. Shruthi Zarate. Please reschedule to next available.

## 2021-11-11 RX ORDER — METOPROLOL TARTRATE 50 MG/1
TABLET ORAL
Qty: 180 TABLET | Refills: 1 | Status: SHIPPED | OUTPATIENT
Start: 2021-11-11 | End: 2021-12-22 | Stop reason: SDUPTHER

## 2021-12-08 LAB — HBA1C MFR BLD HPLC: 5.9 %

## 2021-12-22 RX ORDER — METOPROLOL TARTRATE 50 MG/1
50 TABLET ORAL 2 TIMES DAILY
Qty: 180 TABLET | Refills: 0 | Status: SHIPPED | OUTPATIENT
Start: 2021-12-22 | End: 2022-10-03

## 2022-01-04 NOTE — PROGRESS NOTES
LAST OFFICE VISIT : 8/29/19       ATTENTION:   This medical record was transcribed using an electronic medical records/speech recognition system. Although proofread, it may and can contain electronic, spelling and other errors. Corrections may be executed at a later time. Please feel free to contact us for any clarifications as needed. ICD-10-CM ICD-9-CM   1. Ascending aortic dissection (HCC)  I71.01 441.01   2. Dyslipidemia  E78.5 272.4   3. Aortic valve stenosis, etiology of cardiac valve disease unspecified  I35.0 424.1   4. Essential hypertension  I10 401.9        Yen Perez is a 64 y.o. female with dyslipidemia aortic aneurysm was in 2015. Last seen by me 6 months ago. Cardiac risk factors: family history, dyslipidemia, obesity, sedentary life style, postmenopausal  I have personally obtained the history from the patient. HISTORY OF PRESENTING ILLNESS     Yen Perez she is doing well with no interval cardiac complaints. She has not been active and she did have a cholesterol profile recently her last one was elevated. She denies any chest pain or shortness of breath.        ACTIVE PROBLEM LIST     Patient Active Problem List    Diagnosis Date Noted    Severe obesity ([facility] [address] 08/29/2019    Aortic stenosis 04/29/2015    Postoperative anemia due to acute blood loss 04/27/2015    Ascending aortic dissection (HCC) 04/25/2015    Aortic dissection (HCC) 04/24/2015    Chest pain 04/23/2015    Hypercholesterolemia     Classical migraine     Anxiety and depression            PAST MEDICAL HISTORY     Past Medical History:   Diagnosis Date    Acquired absence of both cervix and uterus     Anxiety and depression     Ascending aortic dissection ([facility] [address] 4/25/2015    Re suspension of aortic valve and 26 mm Hemashield tube graft; circulatory arrest    Classical migraine     Essential hypertension     Fibroids     Hypercholesterolemia     Routine Papanicolaou smear 2009 neg           PAST SURGICAL HISTORY     Past Surgical History:   Procedure Laterality Date    HX MASTOPEXY (BREAST LIFT)  09/2006    HX TONSILLECTOMY      HX TOTAL VAGINAL HYSTERECTOMY  2003    [place] EVASC RPR AAA PSEUDOARYSM ABDL AORTA VISC RS&I  04/25/15    Aortic Valve Repair          ALLERGIES     Allergies   Allergen Reactions    Contrast Agent [Iodine] Other (comments)     Happened as a child but was sent to hospital for treatment. FAMILY HISTORY     Family History   Problem Relation Age of Onset    Other Father         Aneurysm    Heart Disease Father     Other Sister         Aneurysm    Heart Disease Mother     Heart Disease Sister     Heart Disease Maternal Grandfather     Heart Attack Maternal Grandfather     Heart Attack Sister     negative for cardiac disease       SOCIAL HISTORY     Social History     Socioeconomic History    Marital status:    Tobacco Use    Smoking status: Never Smoker    Smokeless tobacco: Never Used   Substance and Sexual Activity    Alcohol use: No     Alcohol/week: 0.0 standard drinks    Drug use: No    Sexual activity: Yes     Partners: Male     Birth control/protection: Surgical     Comment: Hysterectomy         MEDICATIONS     Current Outpatient Medications   Medication Sig    metoprolol tartrate (LOPRESSOR) 50 mg tablet Take 1 Tablet by mouth two (2) times a day.  rosuvastatin (CRESTOR) 20 mg tablet TAKE 1 TABLET BY MOUTH EVERY DAY AT NIGHT    lisinopriL (PRINIVIL, ZESTRIL) 5 mg tablet TAKE 1 TABLET BY MOUTH EVERY DAY    DULoxetine (CYMBALTA) 60 mg capsule Take 60 mg by mouth daily.  aspirin delayed-release 81 mg tablet Take  by mouth daily.  co-enzyme Q-10 (CO Q-10) 100 mg capsule Take 100 mg by mouth daily.  ascorbic acid (VITAMIN C) 500 mg tablet Take  by mouth.  Cholecalciferol, Vitamin D3, (VITAMIN D3) 1,000 unit cap Take  by mouth.     multivitamin (ONE A DAY) tablet Take 1 Tab by mouth daily.    ibuprofen (MOTRIN) 200 mg tablet Take  by mouth daily as needed for Pain.  gabapentin (NEURONTIN) 100 mg capsule 200 mg daily.  omeprazole (PRILOSEC) 40 mg capsule Take 40 mg by mouth Daily (before breakfast). No current facility-administered medications for this visit. I have reviewed the nurses notes, vitals, problem list, allergy list, medical history, family, social history and medications. REVIEW OF SYMPTOMS   Pertinent positive per HPI  General: Pt denies excessive weight gain or loss. Pt is able to conduct ADL's  HEENT: Denies blurred vision, headaches, hearing loss, epistaxis and difficulty swallowing. Respiratory: Denies cough, congestion, shortness of breath, CUELLO, wheezing or stridor. Cardiovascular: Denies precordial pain, palpitations, edema or PND  Gastrointestinal: Denies poor appetite, indigestion, abdominal pain or blood in stool  Genitourinary: Denies hematuria, dysuria, increased urinary frequency  Musculoskeletal: Denies joint pain or swelling from muscles or joints  Neurologic: Denies tremor, paresthesias, headache, or sensory motor disturbance  Psychiatric: Denies confusion, insomnia, depression  Integumentray: Denies rash, itching or ulcers. Hematologic: Denies easy bruising, bleeding     PHYSICAL EXAMINATION      Vitals:    01/05/22 1608   BP: 130/60   Pulse: 68   Weight: 183 lb 4.8 oz (83.1 kg)   Height: 5' 1\" (1.549 m)     General: Well developed, in no acute distress. HEENT: No jaundice, oral mucosa moist, no oral ulcers  Neck: Supple, no stiffness, no lymphadenopathy, supple  Heart: 2/6 systolic murmur at the RUSB unchanged  Respiratory: Clear bilaterally x 4, no wheezing or rales  Extremities:  No edema, normal cap refill, no cyanosis.   Musculoskeletal: No clubbing, no deformities  Neuro: A&Ox3, speech clear, gait stable, cooperative, no focal neurologic deficits         DIAGNOSTIC DATA     1. Echocardiogram   5/22/15 - 45-55% LAE, mild to moderate MR 5/26/16- EF 60%, MR/TR/WV mild   1/9/19- EF 60%, AI mild     2. Cholesterol profile   4/24/15 - , HDL 45, ,    7/24/15 - , HDL 34, LDL 69,    1/26/16 - , HDL 47, ,    3/12/17- , HDL 42,    12/1/17- , HDL 41, , Tg 211   8/8/18- , HDL 47, ,    2/14/19- , HDL 42, ,    7/3/19- , HDL 35, ,    1/7/20- , HDL 36, ,    7/31/20- , HDL 37, ,    3/26/21- , HDL 41, ,     3. Carotid duplex/Vascular studies:   4/24/15 - Right 0-49%, Left 0-49%   8/2/17- L/R 0-9%     4. Cardiac MRI   8/4/15   1. Patient is status post interposition tube graft for ascending aortic   dissection. The ascending aorta tube graft extends from the sinotubular   junction to the aortic arch. In the mid section of the tube graft there is a   kink without limitation of the flow. Proximally the ascending aortic tube   graft measures 27 mm. In the mid section where theere is a kink the tube   graft measures 33 mm and distally it measures 25 mm prior to termination in   the aortic arch. There is no perigraft collection. Normal sinus of Valsalva. The aortic arch and proximal descending thoracic aorta is mildly dilated at   35 mm. 2. There is residual dissection extending from the level of the left common   carotid artery and the aortic arch down to the descending thoracic aorta and   abdominal aorta aorta and extending into the left common iliac and   terminating in the proximal portion of the left external iliac artery. The   right common iliac and the left internal iliac artery are free of   dissection. As expected the false lumen is larger than the true lumen. The   celiac trunk and SMA takeoff is from the true lumen. Both renal arteries   appeared to originate from the false lumen. There is no aneurysm of the   descending thoracic aorta or abdominal aorta.    3. Normal trileaflet aortic valve. No significant aortic regurgitation. 4. Mild 1+ mitral regurgitation. 5. Mild 1+ tricuspid regurgitation. 6. Normal left ventricular size and systolic function. LVEF 60%. 7. Normal right ventricular size and systolic function. RVEF 55%. 8. Normal pleura and pericardium. There is no significant effusions. 9. No intravenous contrast was used for the study. 9/4/15   1. Postoperative changes ascending aorta. Small kink within the graft   unchanged   2. Caliber of the descending thoracic aorta and abdominal aorta unchanged. 3. No paracardial effusion   4. Slow the flow versus partial thrombosis of the proximal descending   thoracic aorta false lumen     3/8/16   1. No interval change in postoperative findings from a descending aorta   dissection repair and associated appearance and size of the ace ascending   and descending aorta. 2. No change in the appearance of the residual descending aortic dissection   with true and false lumens. 5. CT Chest   7/10/18-Status post thoracic aortic repair. No evidence of a sending or descending   thoracic aortic aneurysm. No acute intrathoracic process.      6. Stress Test   Lexiscan- 1/9/19- no ischemia, EF 77%      ECG today normal sinus rhythm with a retinal branch block   LABORATORY DATA            Lab Results   Component Value Date/Time    WBC 11.1 (H) 09/04/2015 12:20 PM    Hemoglobin (POC) 13.3 09/04/2015 12:32 PM    HGB 11.8 09/04/2015 12:20 PM    Hematocrit (POC) 39 09/04/2015 12:32 PM    HCT 37.8 09/04/2015 12:20 PM    PLATELET 384 38/18/5827 12:20 PM    MCV 79.4 (L) 09/04/2015 12:20 PM      Lab Results   Component Value Date/Time    Sodium 137 05/06/2015 04:57 AM    Potassium 3.6 05/06/2015 04:57 AM    Chloride 99 05/06/2015 04:57 AM    CO2 31 05/06/2015 04:57 AM    Anion gap 7 05/06/2015 04:57 AM    Glucose 109 (H) 05/06/2015 04:57 AM    BUN 14 05/06/2015 04:57 AM    Creatinine 0.58 05/06/2015 04:57 AM    BUN/Creatinine ratio 24 (H) 05/06/2015 04:57 AM    GFR est AA >60 05/06/2015 04:57 AM    GFR est non-AA >60 05/06/2015 04:57 AM    Calcium 8.7 05/06/2015 04:57 AM    Bilirubin, total 0.3 02/14/2019 08:10 AM    Alk. phosphatase 77 02/14/2019 08:10 AM    Protein, total 6.4 02/14/2019 08:10 AM    Albumin 4.1 02/14/2019 08:10 AM    Globulin 4.0 09/04/2015 12:20 PM    A-G Ratio 1.0 (L) 09/04/2015 12:20 PM    ALT (SGPT) 22 02/14/2019 08:10 AM           ASSESSMENT/RECOMMENDATIONS:.      1. Aortic aneurysm repair  -Last MRI was in 2019 and it appears as though there is no progression of disease  -Consider repeat MRI later in 2022    2. Dyslipidemia  -I have not seen her lab work I believe since she has been put on the Crestor this was done at her primary care's office will obtain those numbers    3. Hypertension  -Blood pressure looks great today    4. Family history of carotid disease    Return in 6 months or PRN. No orders of the defined types were placed in this encounter. Follow-up and Dispositions  ·   Return in about 6 months (around 7/5/2022). I have discussed the diagnosis with  Tiffanie Jovel and the intended plan as seen in the above orders. Questions were answered concerning future plans. I have discussed medication side effects and warnings with the patient as well. Thank you,  Erik Babinski, MD for involving me in the care of  Tiffanie Jovel. Please do not hesitate to contact me for further questions/concerns. Iglesia Mari MD, [address]      [address] [facility], [address] [place] [address] [address]      [phone] / [phone] Fax

## 2022-01-05 ENCOUNTER — OFFICE VISIT (OUTPATIENT)
Dept: CARDIOLOGY CLINIC | Age: 62
End: 2022-01-05
Payer: COMMERCIAL

## 2022-01-05 VITALS
HEART RATE: 68 BPM | DIASTOLIC BLOOD PRESSURE: 60 MMHG | WEIGHT: 183.3 LBS | SYSTOLIC BLOOD PRESSURE: 130 MMHG | HEIGHT: 61 IN | BODY MASS INDEX: 34.61 KG/M2

## 2022-01-05 DIAGNOSIS — I35.0 AORTIC VALVE STENOSIS, ETIOLOGY OF CARDIAC VALVE DISEASE UNSPECIFIED: ICD-10-CM

## 2022-01-05 DIAGNOSIS — I10 ESSENTIAL HYPERTENSION: ICD-10-CM

## 2022-01-05 DIAGNOSIS — E78.5 DYSLIPIDEMIA: ICD-10-CM

## 2022-01-05 DIAGNOSIS — I71.010 ASCENDING AORTIC DISSECTION: Primary | ICD-10-CM

## 2022-01-05 PROCEDURE — 99214 OFFICE O/P EST MOD 30 MIN: CPT | Performed by: SPECIALIST

## 2022-01-05 NOTE — LETTER
1/5/2022    Patient: Yen Perez   YOB: 1960   Date of Visit: 1/5/2022     Hubert Sunshine MD  [address] [address]  Via Fax: [phone]    Dear Hubert Sunshine MD,      Thank you for referring Ms. Ariel Cespedes to [facility] for evaluation. My notes for this consultation are attached. If you have questions, please do not hesitate to call me. I look forward to following your patient along with you.       Sincerely,    Harjeet Benitez MD

## 2022-01-13 NOTE — PROGRESS NOTES
Annual exam ages 40-58 post hysterectomy      Shireen Alva is a ,  64 y.o. female   No LMP recorded. Patient has had a hysterectomy. She presents for her annual checkup. She is having no significant problems. With regard to the Gardasil vaccine, she is older than the FDA approved age to receive it. Hormonal status:  She reports no perimenstrual type symptoms. She is not having vasomotor symptoms. The patient is not using any ERT. Sexual history:    She  reports being sexually active and has had partner(s) who are male. She reports using the following method of birth control/protection: Surgical.    Medical conditions:    Since her last annual GYN exam about one year ago, she has not the following changes in her health history: none. Surgical history confirmed with patient. has a past surgical history that includes hx mastopexy (breast lift) (2006); hx tonsillectomy; pr evasc rpr aaa pseudoarysm abdl aorta visc rs&i (04/25/15); and hx total vaginal hysterectomy (). Pap and Mammogram History:    Her most recent Pap smear was normal, obtained in . The patient had her mammogram today in our office. Breast Cancer History/Substance Abuse: negative    Osteoporosis History:    Family history does not include a first or second degree relative with osteopenia or osteoporosis. A bone density scan has not been obtained. She is currently taking vit D.     Past Medical History:   Diagnosis Date    Acquired absence of both cervix and uterus     Anxiety and depression     Ascending aortic dissection ([facility] [address] 2015    Re suspension of aortic valve and 26 mm Hemashield tube graft; circulatory arrest    Classical migraine     Essential hypertension     Fibroids     Hypercholesterolemia     Routine Papanicolaou smear  neg     Past Surgical History:   Procedure Laterality Date    HX MASTOPEXY (BREAST LIFT)  2006    HX TONSILLECTOMY      HX TOTAL VAGINAL HYSTERECTOMY 2003    MN EVASC RPR AAA PSEUDOARYSM ABDL AORTA VISC RS&I  04/25/15    Aortic Valve Repair       Current Outpatient Medications   Medication Sig Dispense Refill    metoprolol tartrate (LOPRESSOR) 50 mg tablet Take 1 Tablet by mouth two (2) times a day. 180 Tablet 0    rosuvastatin (CRESTOR) 20 mg tablet TAKE 1 TABLET BY MOUTH EVERY DAY AT NIGHT 90 Tablet 1    lisinopriL (PRINIVIL, ZESTRIL) 5 mg tablet TAKE 1 TABLET BY MOUTH EVERY DAY 90 Tablet 2    DULoxetine (CYMBALTA) 60 mg capsule Take 60 mg by mouth daily.  aspirin delayed-release 81 mg tablet Take  by mouth daily.  Cholecalciferol, Vitamin D3, (VITAMIN D3) 1,000 unit cap Take  by mouth.  gabapentin (NEURONTIN) 100 mg capsule 200 mg daily.  omeprazole (PRILOSEC) 40 mg capsule Take 40 mg by mouth Daily (before breakfast).  co-enzyme Q-10 (CO Q-10) 100 mg capsule Take 100 mg by mouth daily. (Patient not taking: Reported on 1/14/2022)      ascorbic acid (VITAMIN C) 500 mg tablet Take  by mouth. (Patient not taking: Reported on 1/14/2022)      multivitamin (ONE A DAY) tablet Take 1 Tab by mouth daily. (Patient not taking: Reported on 1/14/2022)      ibuprofen (MOTRIN) 200 mg tablet Take  by mouth daily as needed for Pain. (Patient not taking: Reported on 1/14/2022)       Allergies: Contrast agent [iodine]     Tobacco History:  reports that she has never smoked. She has never used smokeless tobacco.  Alcohol Abuse:  reports no history of alcohol use. Drug Abuse:  reports no history of drug use.     Family Medical/Cancer History:   Family History   Problem Relation Age of Onset    Other Father         Aneurysm    Heart Disease Father     Other Sister         Aneurysm    Heart Disease Mother     Heart Disease Sister     Heart Disease Maternal Grandfather     Heart Attack Maternal Grandfather     Heart Attack Sister         Review of Systems - History obtained from the patient  Constitutional: negative for weight loss, fever, night sweats  HEENT: negative for hearing loss, earache, congestion, snoring, sorethroat  CV: negative for chest pain, palpitations, edema  Resp: negative for cough, shortness of breath, wheezing  GI: negative for change in bowel habits, abdominal pain, black or bloody stools  : negative for frequency, dysuria, hematuria, vaginal discharge  MSK: negative for back pain, joint pain, muscle pain  Breast: negative for breast lumps, nipple discharge, galactorrhea  Skin :negative for itching, rash, hives  Neuro: negative for dizziness, headache, confusion, weakness  Psych: negative for anxiety, depression, change in mood  Heme/lymph: negative for bleeding, bruising, pallor    Physical Exam    Visit Vitals  BP (!) 116/54   Wt 182 lb (82.6 kg)   BMI 34.39 kg/m²     Constitutional  · Appearance: well-nourished, well developed, alert, in no acute distress    HENT  · Head and Face: appears normal    Neck  · Inspection/Palpation: normal appearance, no masses or tenderness  · Lymph Nodes: no lymphadenopathy present  · Thyroid: gland size normal, nontender, no nodules or masses present on palpation    Chest  · Respiratory Effort: breathing unlabored  · Auscultation: normal breath sounds    Cardiovascular  · Heart:  · Auscultation: regular rate and rhythm without murmur    Breasts  · Inspection of Breasts: breasts symmetrical, no skin changes, no discharge present, nipple appearance normal, no skin retraction present  · Palpation of Breasts and Axillae: no masses present on palpation, no breast tenderness  · Axillary Lymph Nodes: no lymphadenopathy present    Gastrointestinal  · Abdominal Examination: abdomen non-tender to palpation, normal bowel sounds, no masses present  · Liver and spleen: no hepatomegaly present, spleen not palpable  · Hernias: no hernias identified    Genitourinary  · External Genitalia: normal appearance for age, no discharge present, no tenderness present, no inflammatory lesions present, no masses present, no atrophy present  · Vagina: normal vaginal vault without central or paravaginal defects, no discharge present, no inflammatory lesions present, no masses present  · Bladder: non-tender to palpation  · Urethra: appears normal  · Cervix: absent  · Uterus: absent  · Adnexa: no adnexal tenderness present, no adnexal masses present  · Perineum: perineum within normal limits, no evidence of trauma, no rashes or skin lesions present  · Anus: anus within normal limits, no hemorrhoids present  · Inguinal Lymph Nodes: no lymphadenopathy present    Skin  · General Inspection: no rash, no lesions identified    Neurologic/Psychiatric  · Mental Status:  · Orientation: grossly oriented to person, place and time  · Mood and Affect: mood normal, affect appropriate    Assessment:  Routine gynecologic examination  Her current medical status is satisfactory with no evidence of significant gynecologic issues.     Plan:  Counseled re: diet, exercise, healthy lifestyle  Return for yearly wellness visits  Rec annual mammogram

## 2022-01-14 ENCOUNTER — OFFICE VISIT (OUTPATIENT)
Dept: OBGYN CLINIC | Age: 62
End: 2022-01-14

## 2022-01-14 VITALS — BODY MASS INDEX: 34.39 KG/M2 | SYSTOLIC BLOOD PRESSURE: 116 MMHG | DIASTOLIC BLOOD PRESSURE: 54 MMHG | WEIGHT: 182 LBS

## 2022-01-14 DIAGNOSIS — Z01.419 ENCOUNTER FOR GYNECOLOGICAL EXAMINATION WITHOUT ABNORMAL FINDING: Primary | ICD-10-CM

## 2022-01-14 PROCEDURE — 99396 PREV VISIT EST AGE 40-64: CPT | Performed by: OBSTETRICS & GYNECOLOGY

## 2022-01-14 NOTE — PATIENT INSTRUCTIONS

## 2022-03-19 PROBLEM — E66.01 SEVERE OBESITY (HCC): Status: ACTIVE | Noted: 2019-08-29

## 2022-04-26 RX ORDER — ROSUVASTATIN CALCIUM 20 MG/1
20 TABLET, COATED ORAL
Qty: 90 TABLET | Refills: 1 | Status: SHIPPED | OUTPATIENT
Start: 2022-04-26 | End: 2022-10-25

## 2022-05-25 RX ORDER — LISINOPRIL 5 MG/1
TABLET ORAL
Qty: 90 TABLET | Refills: 0 | Status: SHIPPED | OUTPATIENT
Start: 2022-05-25 | End: 2022-08-15

## 2022-07-05 NOTE — PROGRESS NOTES
CARDIOLOGY OFFICE NOTE    Iglesia Mccabe MD, [address], [place], [address] [facility]  Phone [phone]; Fax [phone]  Mobile [phone]   Voice Mail [phone]                                                          LAST OFFICE VISIT : 8/29/19       ATTENTION:   This medical record was transcribed using an electronic medical records/speech recognition system. Although proofread, it may and can contain electronic, spelling and other errors. Corrections may be executed at a later time. Please feel free to contact us for any clarifications as needed. ICD-10-CM ICD-9-CM   1. Ascending aortic dissection (HCC)  I71.01 441.01   2. Dyslipidemia  E78.5 272.4   3. Aortic valve stenosis, etiology of cardiac valve disease unspecified  I35.0 424.1   4. Essential hypertension  I10 401.9   5. Carotid stenosis, bilateral  I65.23 433.10     433.30        Mat Roman is a 64 y.o. female with dyslipidemia aortic aneurysm was in 2015. Last seen by me 6 months ago. Cardiac risk factors: family history, dyslipidemia, obesity, sedentary life style, postmenopausal  I have personally obtained the history from the patient. HISTORY OF PRESENTING ILLNESS     Mat Roman has number of cardiac issues. She is doing well no chest pain or shortness of breath. She has a new grandson named Jacinda Rendon.   We talked about the importance of exercise which she is currently not doing as if she walk at least 30 minutes 5 days a week       ACTIVE PROBLEM LIST     Patient Active Problem List    Diagnosis Date Noted    Severe obesity ([address] 08/29/2019    Aortic stenosis 04/29/2015    Postoperative anemia due to acute blood loss 04/27/2015    Ascending aortic dissection (HCC) 04/25/2015    Aortic dissection (HCC) 04/24/2015    Chest pain 04/23/2015    Hypercholesterolemia     Classical migraine     Anxiety and depression            PAST MEDICAL HISTORY     Past Medical History: Diagnosis Date    Acquired absence of both cervix and uterus     Anxiety and depression     Ascending aortic dissection (HCC) 4/25/2015    Re suspension of aortic valve and 26 mm Hemashield tube graft; circulatory arrest    Classical migraine     Essential hypertension     Fibroids     Hypercholesterolemia     Routine Papanicolaou smear 2009 neg           PAST SURGICAL HISTORY     Past Surgical History:   Procedure Laterality Date    HX MASTOPEXY (BREAST LIFT)  09/2006    HX TONSILLECTOMY      HX TOTAL VAGINAL HYSTERECTOMY  2003    MN EVASC RPR AAA PSEUDOARYSM ABDL AORTA VISC RS&I  04/25/15    Aortic Valve Repair          ALLERGIES     Allergies   Allergen Reactions    Contrast Agent [Iodine] Other (comments)     Happened as a child but was sent to hospital for treatment. FAMILY HISTORY     Family History   Problem Relation Age of Onset    Other Father         Aneurysm    Heart Disease Father     Other Sister         Aneurysm    Heart Disease Mother     Heart Disease Sister     Heart Disease Maternal Grandfather     Heart Attack Maternal Grandfather     Heart Attack Sister     negative for cardiac disease       SOCIAL HISTORY     Social History     Socioeconomic History    Marital status:    Tobacco Use    Smoking status: Never Smoker    Smokeless tobacco: Never Used   Substance and Sexual Activity    Alcohol use: No     Alcohol/week: 0.0 standard drinks    Drug use: No    Sexual activity: Yes     Partners: Male     Birth control/protection: Surgical     Comment: Hysterectomy         MEDICATIONS     Current Outpatient Medications   Medication Sig    lisinopriL (PRINIVIL, ZESTRIL) 5 mg tablet TAKE 1 TABLET BY MOUTH EVERY DAY    rosuvastatin (CRESTOR) 20 mg tablet Take 1 Tablet by mouth nightly.  metoprolol tartrate (LOPRESSOR) 50 mg tablet Take 1 Tablet by mouth two (2) times a day.  DULoxetine (CYMBALTA) 60 mg capsule Take 60 mg by mouth daily.     aspirin delayed-release 81 mg tablet Take  by mouth daily.  co-enzyme Q-10 (CO Q-10) 100 mg capsule Take 100 mg by mouth daily.  ascorbic acid (VITAMIN C) 500 mg tablet Take  by mouth.  Cholecalciferol, Vitamin D3, (VITAMIN D3) 1,000 unit cap Take  by mouth.  multivitamin (ONE A DAY) tablet Take 1 Tablet by mouth daily.  ibuprofen (MOTRIN) 200 mg tablet Take  by mouth daily as needed for Pain.  gabapentin (NEURONTIN) 100 mg capsule 200 mg daily.  omeprazole (PRILOSEC) 40 mg capsule Take 40 mg by mouth Daily (before breakfast). No current facility-administered medications for this visit. I have reviewed the nurses notes, vitals, problem list, allergy list, medical history, family, social history and medications. REVIEW OF SYMPTOMS   Pertinent positive per HPI  General: Pt denies excessive weight gain or loss. Pt is able to conduct ADL's  HEENT: Denies blurred vision, headaches, hearing loss, epistaxis and difficulty swallowing. Respiratory: Denies cough, congestion, shortness of breath, CUELLO, wheezing or stridor. Cardiovascular: Denies precordial pain, palpitations, edema or PND  Gastrointestinal: Denies poor appetite, indigestion, abdominal pain or blood in stool  Genitourinary: Denies hematuria, dysuria, increased urinary frequency  Musculoskeletal: Denies joint pain or swelling from muscles or joints  Neurologic: Denies tremor, paresthesias, headache, or sensory motor disturbance  Psychiatric: Denies confusion, insomnia, depression  Integumentray: Denies rash, itching or ulcers. Hematologic: Denies easy bruising, bleeding     PHYSICAL EXAMINATION      Vitals:    07/07/22 0832   Weight: 184 lb 3.2 oz (83.6 kg)   Height: 5' 1\" (1.549 m)     General: Well developed, in no acute distress.   HEENT: No jaundice, oral mucosa moist, no oral ulcers  Neck: Supple, no stiffness, no lymphadenopathy, supple  Heart: 2/6 systolic murmur at the RUSB unchanged  Respiratory: Clear bilaterally x 4, no wheezing or rales  Extremities:  No edema, normal cap refill, no cyanosis. Musculoskeletal: No clubbing, no deformities  Neuro: A&Ox3, speech clear, gait stable, cooperative, no focal neurologic deficits         DIAGNOSTIC DATA     1. Echocardiogram   5/22/15 - 45-55% LAE, mild to moderate MR   5/26/16- EF 60%, MR/TR/MT mild   1/9/19- EF 60%, AI mild     2. Cholesterol profile   4/24/15 - , HDL 45, ,    7/24/15 - , HDL 34, LDL 69,    1/26/16 - , HDL 47, ,    3/12/17- , HDL 42,    12/1/17- , HDL 41, , Tg 211   8/8/18- , HDL 47, ,    2/14/19- , HDL 42, ,    7/3/19- , HDL 35, ,    1/7/20- , HDL 36, ,    7/31/20- , HDL 37, ,    3/26/21- , HDL 41, ,     3. Carotid duplex/Vascular studies:   4/24/15 - Right 0-49%, Left 0-49%   8/2/17- L/R 0-9%     4. Cardiac MRI   8/4/15   1. Patient is status post interposition tube graft for ascending aortic   dissection. The ascending aorta tube graft extends from the sinotubular   junction to the aortic arch. In the mid section of the tube graft there is a   kink without limitation of the flow. Proximally the ascending aortic tube   graft measures 27 mm. In the mid section where theere is a kink the tube   graft measures 33 mm and distally it measures 25 mm prior to termination in   the aortic arch. There is no perigraft collection. Normal sinus of Valsalva. The aortic arch and proximal descending thoracic aorta is mildly dilated at   35 mm. 2. There is residual dissection extending from the level of the left common   carotid artery and the aortic arch down to the descending thoracic aorta and   abdominal aorta aorta and extending into the left common iliac and   terminating in the proximal portion of the left external iliac artery.  The   right common iliac and the left internal iliac artery are free of   dissection. As expected the false lumen is larger than the true lumen. The   celiac trunk and SMA takeoff is from the true lumen. Both renal arteries   appeared to originate from the false lumen. There is no aneurysm of the   descending thoracic aorta or abdominal aorta. 3. Normal trileaflet aortic valve. No significant aortic regurgitation. 4. Mild 1+ mitral regurgitation. 5. Mild 1+ tricuspid regurgitation. 6. Normal left ventricular size and systolic function. LVEF 60%. 7. Normal right ventricular size and systolic function. RVEF 55%. 8. Normal pleura and pericardium. There is no significant effusions. 9. No intravenous contrast was used for the study. 9/4/15   1. Postoperative changes ascending aorta. Small kink within the graft   unchanged   2. Caliber of the descending thoracic aorta and abdominal aorta unchanged. 3. No paracardial effusion   4. Slow the flow versus partial thrombosis of the proximal descending   thoracic aorta false lumen     3/8/16   1. No interval change in postoperative findings from a descending aorta   dissection repair and associated appearance and size of the ace ascending   and descending aorta. 2. No change in the appearance of the residual descending aortic dissection   with true and false lumens. 5. CT Chest   7/10/18-Status post thoracic aortic repair. No evidence of a sending or descending   thoracic aortic aneurysm. No acute intrathoracic process.      6. Stress Test   Lexiscan- 1/9/19- no ischemia, EF 77%         LABORATORY DATA            Lab Results   Component Value Date/Time    WBC 11.1 (H) 09/04/2015 12:20 PM    Hemoglobin (POC) 13.3 09/04/2015 12:32 PM    HGB 11.8 09/04/2015 12:20 PM    Hematocrit (POC) 39 09/04/2015 12:32 PM    HCT 37.8 09/04/2015 12:20 PM    PLATELET 235 91/56/5978 12:20 PM    MCV 79.4 (L) 09/04/2015 12:20 PM      Lab Results   Component Value Date/Time    Sodium 137 05/06/2015 04:57 AM    Potassium 3.6 05/06/2015 04:57 AM    Chloride 99 05/06/2015 04:57 AM    CO2 31 05/06/2015 04:57 AM    Anion gap 7 05/06/2015 04:57 AM    Glucose 109 (H) 05/06/2015 04:57 AM    BUN 14 05/06/2015 04:57 AM    Creatinine 0.58 05/06/2015 04:57 AM    BUN/Creatinine ratio 24 (H) 05/06/2015 04:57 AM    GFR est AA >60 05/06/2015 04:57 AM    GFR est non-AA >60 05/06/2015 04:57 AM    Calcium 8.7 05/06/2015 04:57 AM    Bilirubin, total 0.3 02/14/2019 08:10 AM    Alk. phosphatase 77 02/14/2019 08:10 AM    Protein, total 6.4 02/14/2019 08:10 AM    Albumin 4.1 02/14/2019 08:10 AM    Globulin 4.0 09/04/2015 12:20 PM    A-G Ratio 1.0 (L) 09/04/2015 12:20 PM    ALT (SGPT) 22 02/14/2019 08:10 AM      ECG: (7/7/2022)- sinus rhythm bundle branch block     ASSESSMENT/RECOMMENDATIONS:.      1. Aortic aneurysm repair  -Last MRI was in 2019 and it appears as though there is no progression of disease  -We will be repeating MRI in 2022 we will arrange this    2. Dyslipidemia  -LDL is at goal now on Crestor and tolerating it with no complications  -Cholesterols followed by her primary care      3. Hypertension  -Blood pressure is good on her current medical regimen    4. Family history of carotid disease    Return in 6 months or PRN. No orders of the defined types were placed in this encounter. Follow-up and Dispositions  ·   Return in about 6 months (around 1/7/2023). I have discussed the diagnosis with  Dunia Crum and the intended plan as seen in the above orders. Questions were answered concerning future plans. I have discussed medication side effects and warnings with the patient as well. Thank you,  Miller Peter MD for involving me in the care of  Dunia Crum. Please do not hesitate to contact me for further questions/concerns. Iglesia Mari MD, [address] [place] [address] Rd[address]      [facility], [address] [place] [address], [address]      [phone] / [phone] Fax

## 2022-07-05 NOTE — PATIENT INSTRUCTIONS

## 2022-07-07 ENCOUNTER — OFFICE VISIT (OUTPATIENT)
Dept: CARDIOLOGY CLINIC | Age: 62
End: 2022-07-07
Payer: COMMERCIAL

## 2022-07-07 VITALS
SYSTOLIC BLOOD PRESSURE: 130 MMHG | OXYGEN SATURATION: 98 % | HEIGHT: 61 IN | HEART RATE: 64 BPM | WEIGHT: 184.2 LBS | DIASTOLIC BLOOD PRESSURE: 70 MMHG | BODY MASS INDEX: 34.78 KG/M2

## 2022-07-07 DIAGNOSIS — I10 ESSENTIAL HYPERTENSION: ICD-10-CM

## 2022-07-07 DIAGNOSIS — Z98.890 H/O AORTIC ANEURYSM REPAIR: Primary | ICD-10-CM

## 2022-07-07 DIAGNOSIS — I71.010 ASCENDING AORTIC DISSECTION: Primary | ICD-10-CM

## 2022-07-07 DIAGNOSIS — I65.23 CAROTID STENOSIS, BILATERAL: ICD-10-CM

## 2022-07-07 DIAGNOSIS — Z86.79 H/O AORTIC ANEURYSM REPAIR: Primary | ICD-10-CM

## 2022-07-07 DIAGNOSIS — I35.0 AORTIC VALVE STENOSIS, ETIOLOGY OF CARDIAC VALVE DISEASE UNSPECIFIED: ICD-10-CM

## 2022-07-07 DIAGNOSIS — E78.5 DYSLIPIDEMIA: ICD-10-CM

## 2022-07-07 PROCEDURE — 93000 ELECTROCARDIOGRAM COMPLETE: CPT | Performed by: SPECIALIST

## 2022-07-07 PROCEDURE — 99214 OFFICE O/P EST MOD 30 MIN: CPT | Performed by: SPECIALIST

## 2022-07-07 NOTE — LETTER
7/7/2022    Patient: Billy Henderson   YOB: 1960   Date of Visit: 7/7/2022     Jm Cardona MD  [address] [address]  Via Fax: [phone]    Dear Jm Cardona MD,      Thank you for referring Ms. Sharon Stevenson to [facility] for evaluation. My notes for this consultation are attached. If you have questions, please do not hesitate to call me. I look forward to following your patient along with you.       Sincerely,    Stuart Head MD

## 2022-07-07 NOTE — PROGRESS NOTES
Chief Complaint   Patient presents with    Follow-up     6month,CVD    Hypertension       Vitals:    07/07/22 0832   BP: 130/70   Pulse: 64   Height: 5' 1\" (1.549 m)   Weight: 184 lb 3.2 oz (83.6 kg)   SpO2: 98%         Chest pain: No    SOB: no    Palpitations: no    Dizziness: no    Swelling: no    Refills:       1. Have you been to the ER, urgent care clinic since your last visit? Hospitalized since your last visit? no    2. Have you sen or consulted other health care providers outside of the [facility] since your last visit?  (Include any pap smears or colon screening.)

## 2022-08-15 RX ORDER — LISINOPRIL 5 MG/1
TABLET ORAL
Qty: 90 TABLET | Refills: 3 | Status: SHIPPED | OUTPATIENT
Start: 2022-08-15

## 2022-08-29 ENCOUNTER — HOSPITAL ENCOUNTER (OUTPATIENT)
Dept: MRI IMAGING | Age: 62
Discharge: HOME | End: 2022-08-29
Attending: SPECIALIST
Payer: COMMERCIAL

## 2022-08-29 DIAGNOSIS — Z98.890 H/O AORTIC ANEURYSM REPAIR: ICD-10-CM

## 2022-08-29 DIAGNOSIS — Z86.79 H/O AORTIC ANEURYSM REPAIR: ICD-10-CM

## 2022-08-29 PROCEDURE — 75557 CARDIAC MRI FOR MORPH: CPT

## 2022-08-29 PROCEDURE — 75557 CARDIAC MRI FOR MORPH: CPT | Performed by: INTERNAL MEDICINE

## 2022-09-07 NOTE — PROGRESS NOTES
Your cardiac MRI revealed no significant changes from the last 1 and this is great news.     All the best,    Kasie Kendrick

## 2022-09-08 ENCOUNTER — DOCUMENTATION ONLY (OUTPATIENT)
Dept: CARDIOLOGY CLINIC | Age: 62
End: 2022-09-08

## 2022-10-03 RX ORDER — METOPROLOL TARTRATE 50 MG/1
TABLET ORAL
Qty: 180 TABLET | Refills: 1 | Status: SHIPPED | OUTPATIENT
Start: 2022-10-03

## 2022-10-06 LAB — HBA1C MFR BLD HPLC: 5.9 %

## 2022-10-25 RX ORDER — ROSUVASTATIN CALCIUM 20 MG/1
20 TABLET, COATED ORAL
Qty: 90 TABLET | Refills: 1 | Status: SHIPPED | OUTPATIENT
Start: 2022-10-25

## 2023-01-10 ENCOUNTER — OFFICE VISIT (OUTPATIENT)
Dept: CARDIOLOGY CLINIC | Age: 63
End: 2023-01-10
Payer: COMMERCIAL

## 2023-01-10 VITALS
HEART RATE: 69 BPM | WEIGHT: 184 LBS | SYSTOLIC BLOOD PRESSURE: 138 MMHG | BODY MASS INDEX: 34.74 KG/M2 | HEIGHT: 61 IN | DIASTOLIC BLOOD PRESSURE: 70 MMHG | OXYGEN SATURATION: 98 %

## 2023-01-10 DIAGNOSIS — E78.5 DYSLIPIDEMIA: ICD-10-CM

## 2023-01-10 DIAGNOSIS — I35.0 AORTIC VALVE STENOSIS, ETIOLOGY OF CARDIAC VALVE DISEASE UNSPECIFIED: ICD-10-CM

## 2023-01-10 DIAGNOSIS — I10 ESSENTIAL HYPERTENSION: ICD-10-CM

## 2023-01-10 DIAGNOSIS — I71.010 ASCENDING AORTIC DISSECTION: Primary | ICD-10-CM

## 2023-01-10 PROCEDURE — 3078F DIAST BP <80 MM HG: CPT | Performed by: SPECIALIST

## 2023-01-10 PROCEDURE — 3075F SYST BP GE 130 - 139MM HG: CPT | Performed by: SPECIALIST

## 2023-01-10 PROCEDURE — 99214 OFFICE O/P EST MOD 30 MIN: CPT | Performed by: SPECIALIST

## 2023-01-10 NOTE — LETTER
1/10/2023    Patient: Sole Hadley   YOB: 1960   Date of Visit: 1/10/2023     Supriya Pete MD  [address] [address]  Via Fax: [phone]    Dear Supriya Pete MD,      Thank you for referring Ms. Jules Ewing to [address] [place] [address] for evaluation. My notes for this consultation are attached. If you have questions, please do not hesitate to call me. I look forward to following your patient along with you.       Sincerely,    Benton Luna MD

## 2023-01-10 NOTE — PROGRESS NOTES
Katherin Sifuentes is a 58 y.o. female    There were no vitals filed for this visit. Chief Complaint   Patient presents with    Cholesterol Problem    Hypertension    Carotid Artery Stenosis     AS       Chest pain NO  SOB NO  Dizziness NO  Swelling NO  Recent hospital visit NO  Refills NO  COVID VACCINE YES  HAD COVID?  YES

## 2023-01-10 NOTE — PATIENT INSTRUCTIONS

## 2023-01-10 NOTE — PROGRESS NOTES
CARDIOLOGY OFFICE NOTE    Iglesia Brown MD, [address], [address], [address] [place] [address] N[place]  Phone [phone]; Fax [phone]  Mobile [phone]   Voice Mail [phone]                                                          LAST OFFICE VISIT : 8/29/19       ATTENTION:   This medical record was transcribed using an electronic medical records/speech recognition system. Although proofread, it may and can contain electronic, spelling and other errors. Corrections may be executed at a later time. Please feel free to contact us for any clarifications as needed. ICD-10-CM ICD-9-CM   1. Ascending aortic dissection  I71.010 441.01   2. Dyslipidemia  E78.5 272.4   3. Aortic valve stenosis, etiology of cardiac valve disease unspecified  I35.0 424.1   4. Essential hypertension  I10 401.9        Justine Justin is a 58 y.o. female with dyslipidemia aortic aneurysm was in 2015. Last seen by me 6 months ago. Cardiac risk factors: family history, dyslipidemia, obesity, sedentary life style, postmenopausal  I have personally obtained the history from the patient. HISTORY OF PRESENTING ILLNESS     Justine Justin has no interval cardiac complaints. She is status post aortic aneurysm repair in 2015. We reviewed her last  MRI and the repaired aortic aneurysm appears to be functioning normally. Encouraged today weight loss and to continue to eat healthy and clean and reduce carbohydrate intake. She did have COVID prior to [place] and her son is getting  very shortly.   She has a new grandson whose name is [id number] [facility]     Patient Active Problem List    Diagnosis Date Noted    Severe obesity ([facility] [address] 08/29/2019    Aortic stenosis 04/29/2015    Postoperative anemia due to acute blood loss 04/27/2015    Ascending aortic dissection 04/25/2015    Aortic dissection ([facility] [address] [address] 04/24/2015    Chest pain 04/23/2015    Hypercholesterolemia     Classical migraine Anxiety and depression            PAST MEDICAL HISTORY     Past Medical History:   Diagnosis Date    Acquired absence of both cervix and uterus     Anxiety and depression     Ascending aortic dissection 4/25/2015    Re suspension of aortic valve and 26 mm Hemashield tube graft; circulatory arrest    Classical migraine     Essential hypertension     Fibroids     Hypercholesterolemia     Routine Papanicolaou smear 2009 neg           PAST SURGICAL HISTORY     Past Surgical History:   Procedure Laterality Date    HX MASTOPEXY (BREAST LIFT)  09/2006    HX TONSILLECTOMY      HX TOTAL VAGINAL HYSTERECTOMY  2003    UT EVASC RPR AAA PSEUDOARYSM ABDL AORTA VISC RS&I  04/25/15    Aortic Valve Repair          ALLERGIES     Allergies   Allergen Reactions    Contrast Agent [Iodine] Other (comments)     Happened as a child but was sent to hospital for treatment.            FAMILY HISTORY     Family History   Problem Relation Age of Onset    Other Father         Aneurysm    Heart Disease Father     Other Sister         Aneurysm    Heart Disease Mother     Heart Disease Sister     Heart Disease Maternal Grandfather     Heart Attack Maternal Grandfather     Heart Attack Sister     negative for cardiac disease       SOCIAL HISTORY     Social History     Socioeconomic History    Marital status:    Tobacco Use    Smoking status: Never    Smokeless tobacco: Never   Substance and Sexual Activity    Alcohol use: No     Alcohol/week: 0.0 standard drinks    Drug use: No    Sexual activity: Yes     Partners: Male     Birth control/protection: Surgical     Comment: Hysterectomy         MEDICATIONS     Current Outpatient Medications   Medication Sig    rosuvastatin (CRESTOR) 20 mg tablet TAKE 1 TABLET BY MOUTH NIGHTLY    metoprolol tartrate (LOPRESSOR) 50 mg tablet TAKE 1 TABLET BY MOUTH TWICE A DAY    lisinopriL (PRINIVIL, ZESTRIL) 5 mg tablet TAKE 1 TABLET BY MOUTH EVERY DAY    DULoxetine (CYMBALTA) 60 mg capsule Take 60 mg by mouth daily.    aspirin delayed-release 81 mg tablet Take  by mouth daily. co-enzyme Q-10 (CO Q-10) 100 mg capsule Take 100 mg by mouth daily. ascorbic acid, vitamin C, (VITAMIN C) 500 mg tablet Take  by mouth. cholecalciferol (VITAMIN D3) 25 mcg (1,000 unit) cap Take  by mouth.    multivitamin (ONE A DAY) tablet Take 1 Tablet by mouth daily. ibuprofen (MOTRIN) 200 mg tablet Take  by mouth daily as needed for Pain. omeprazole (PRILOSEC) 40 mg capsule Take 40 mg by mouth Daily (before breakfast). gabapentin (NEURONTIN) 100 mg capsule 200 mg daily. (Patient not taking: Reported on 1/10/2023)     No current facility-administered medications for this visit. I have reviewed the nurses notes, vitals, problem list, allergy list, medical history, family, social history and medications. REVIEW OF SYMPTOMS   Pertinent positive per HPI  General: Pt denies excessive weight gain or loss. Pt is able to conduct ADL's  HEENT: Denies blurred vision, headaches, hearing loss, epistaxis and difficulty swallowing. Respiratory: Denies cough, congestion, shortness of breath, CUELLO, wheezing or stridor. Cardiovascular: Denies precordial pain, palpitations, edema or PND  Gastrointestinal: Denies poor appetite, indigestion, abdominal pain or blood in stool  Genitourinary: Denies hematuria, dysuria, increased urinary frequency  Musculoskeletal: Denies joint pain or swelling from muscles or joints  Neurologic: Denies tremor, paresthesias, headache, or sensory motor disturbance  Psychiatric: Denies confusion, insomnia, depression  Integumentray: Denies rash, itching or ulcers. Hematologic: Denies easy bruising, bleeding     PHYSICAL EXAMINATION      Vitals:    01/10/23 0813   BP: 138/70   Pulse: 69   SpO2: 98%   Weight: 184 lb (83.5 kg)   Height: 5' 1\" (1.549 m)     General: Well developed, in no acute distress.   HEENT: No jaundice  Neck: Supple, no stiffness  Heart: 2/6 systolic murmur at the RUSB unchanged  Respiratory: Clear bilaterally x 4, no wheezing or rales  Extremities:  No edema, normal cap refill, no cyanosis. Musculoskeletal: No clubbing, no deformities  Neuro: A&Ox3, speech clear, gait stable, cooperative, no focal neurologic deficits         DIAGNOSTIC DATA     1. Echocardiogram   5/22/15 - 45-55% LAE, mild to moderate MR   5/26/16- EF 60%, MR/TR/MN mild   1/9/19- EF 60%, AI mild     2. Cholesterol profile   7/3/19- , HDL 35, ,    1/7/20- , HDL 36, ,    7/31/20- , HDL 37, ,    3/26/21- , HDL 41, ,   2/2022-  ,HDL 40,LDL  68   10/6/22- , HDL 42, LDL 58,     3. Carotid duplex/Vascular studies:   4/24/15 - Right 0-49%, Left 0-49%   8/2/17- L/R 0-9%     4. Cardiac MRI   8/4/15   1. Patient is status post interposition tube graft for ascending aortic   dissection. The ascending aorta tube graft extends from the sinotubular   junction to the aortic arch. In the mid section of the tube graft there is a   kink without limitation of the flow. Proximally the ascending aortic tube   graft measures 27 mm. In the mid section where theere is a kink the tube   graft measures 33 mm and distally it measures 25 mm prior to termination in   the aortic arch. There is no perigraft collection. Normal sinus of Valsalva. The aortic arch and proximal descending thoracic aorta is mildly dilated at   35 mm. 2. There is residual dissection extending from the level of the left common   carotid artery and the aortic arch down to the descending thoracic aorta and   abdominal aorta aorta and extending into the left common iliac and   terminating in the proximal portion of the left external iliac artery. The   right common iliac and the left internal iliac artery are free of   dissection. As expected the false lumen is larger than the true lumen. The   celiac trunk and SMA takeoff is from the true lumen.  Both renal arteries appeared to originate from the false lumen. There is no aneurysm of the   descending thoracic aorta or abdominal aorta. 3. Normal trileaflet aortic valve. No significant aortic regurgitation. 4. Mild 1+ mitral regurgitation. 5. Mild 1+ tricuspid regurgitation. 6. Normal left ventricular size and systolic function. LVEF 60%. 7. Normal right ventricular size and systolic function. RVEF 55%. 8. Normal pleura and pericardium. There is no significant effusions. 9. No intravenous contrast was used for the study. 9/4/15   1. Postoperative changes ascending aorta. Small kink within the graft   unchanged   2. Caliber of the descending thoracic aorta and abdominal aorta unchanged. 3. No paracardial effusion   4. Slow the flow versus partial thrombosis of the proximal descending   thoracic aorta false lumen     3/8/16   1. No interval change in postoperative findings from a descending aorta   dissection repair and associated appearance and size of the ace ascending   and descending aorta. 2. No change in the appearance of the residual descending aortic dissection   with true and false lumens. 8/29/22-Your cardiac MRI revealed no significant changes from the last 1     5. CT Chest   7/10/18-Status post thoracic aortic repair. No evidence of a sending or descending   thoracic aortic aneurysm. No acute intrathoracic process.      6. Stress Test   Lexiscan- 1/9/19- no ischemia, EF 77%         LABORATORY DATA            Lab Results   Component Value Date/Time    WBC 11.1 (H) 09/04/2015 12:20 PM    Hemoglobin (POC) 13.3 09/04/2015 12:32 PM    HGB 11.8 09/04/2015 12:20 PM    Hematocrit (POC) 39 09/04/2015 12:32 PM    HCT 37.8 09/04/2015 12:20 PM    PLATELET 799 33/72/1652 12:20 PM    MCV 79.4 (L) 09/04/2015 12:20 PM      Lab Results   Component Value Date/Time    Sodium 137 05/06/2015 04:57 AM    Potassium 3.6 05/06/2015 04:57 AM    Chloride 99 05/06/2015 04:57 AM    CO2 31 05/06/2015 04:57 AM    Anion gap 7 05/06/2015 04:57 AM    Glucose 109 (H) 05/06/2015 04:57 AM    BUN 14 05/06/2015 04:57 AM    Creatinine 0.58 05/06/2015 04:57 AM    BUN/Creatinine ratio 24 (H) 05/06/2015 04:57 AM    GFR est AA >60 05/06/2015 04:57 AM    GFR est non-AA >60 05/06/2015 04:57 AM    Calcium 8.7 05/06/2015 04:57 AM    Bilirubin, total 0.3 02/14/2019 08:10 AM    Alk. phosphatase 77 02/14/2019 08:10 AM    Protein, total 6.4 02/14/2019 08:10 AM    Albumin 4.1 02/14/2019 08:10 AM    Globulin 4.0 09/04/2015 12:20 PM    A-G Ratio 1.0 (L) 09/04/2015 12:20 PM    ALT (SGPT) 22 02/14/2019 08:10 AM      ECG: (7/7/2022)- sinus rhythm bundle branch block     ASSESSMENT/RECOMMENDATIONS:.      1. Aortic aneurysm repair  -MRI in July demonstrated no change  -We will repeat in another year    2. Dyslipidemia  -LDL at goal      3. Hypertension  -Blood pressure is good today with no adjustments antihypertensives  -Continue low-sodium diet and exercise    4. Family history of carotid disease  -Last carotids were unremarkable    5. STEVE  -uses mouth guard    Return in 1 year    No orders of the defined types were placed in this encounter. I have discussed the diagnosis with  Gilma Mckinney and the intended plan as seen in the above orders. Questions were answered concerning future plans. I have discussed medication side effects and warnings with the patient as well. Thank you,  Sai Kirkland MD for involving me in the care of  Gilma Mckinney. Please do not hesitate to contact me for further questions/concerns. Iglesia Mari MD, [address]      [facility], [address] [place] [address] Drive      [phone] / [phone] Fax

## 2023-04-03 RX ORDER — METOPROLOL TARTRATE 50 MG/1
TABLET ORAL
Qty: 180 TABLET | Refills: 3 | Status: SHIPPED | OUTPATIENT
Start: 2023-04-03

## 2023-04-24 RX ORDER — ROSUVASTATIN CALCIUM 20 MG/1
20 TABLET, COATED ORAL
Qty: 90 TABLET | Refills: 1 | Status: SHIPPED | OUTPATIENT
Start: 2023-04-24

## 2023-09-18 NOTE — PROGRESS NOTES
Petar Negron is a 61 y.o. female returns for an annual exam     Chief Complaint   Patient presents with    Annual Exam       No LMP recorded. Her periods are absent in flow   Problems: no problems  Birth Control: status post hysterectomy. Last Pap: normal obtained few year(s) ago. She does not have a history of MONAE 2, 3 or cervical cancer. Last Mammogram: had her mammogram today in our office. Last colonoscopy: normal obtained 8 year(s) ago. 1. Have you been to the ER, urgent care clinic, or hospitalized since your last visit? No    2. Have you seen or consulted any other health care providers outside of the [address] [place] [address] Avenue since your last visit? No    Examination chaperoned by Christiana Romo LPN.

## 2023-09-19 ENCOUNTER — OFFICE VISIT (OUTPATIENT)
Age: 63
End: 2023-09-19
Payer: COMMERCIAL

## 2023-09-19 VITALS — BODY MASS INDEX: 35.62 KG/M2 | DIASTOLIC BLOOD PRESSURE: 70 MMHG | SYSTOLIC BLOOD PRESSURE: 122 MMHG | WEIGHT: 188.5 LBS

## 2023-09-19 DIAGNOSIS — Z01.419 ENCOUNTER FOR GYNECOLOGICAL EXAMINATION (GENERAL) (ROUTINE) WITHOUT ABNORMAL FINDINGS: Primary | ICD-10-CM

## 2023-09-19 PROCEDURE — 99396 PREV VISIT EST AGE 40-64: CPT | Performed by: OBSTETRICS & GYNECOLOGY

## 2023-09-19 NOTE — PROGRESS NOTES
Annual exam post total hysterectomy post menopause        Paola Crawford is a [id number],  61 y.o. female   No LMP recorded. She presents for her annual checkup. She is having no problems. Hormonal status:  She is not having vasomotor symptoms. The patient is not using any ERT. Sexual history:    She  has no history on file for sexual activity. Birth Control: status post hysterectomy. Per Nursing Note:  Last Pap: normal obtained few year(s) ago. She does not have a history of MONAE 2, 3 or cervical cancer. Last Mammogram: had her mammogram today in our office. Last colonoscopy: normal obtained 8 year(s) ago.     Past Medical History:   Diagnosis Date    Acquired absence of both cervix and uterus     Anxiety and depression     Ascending aortic dissection (HCC) 4/25/2015    Re suspension of aortic valve and 26 mm Hemashield tube graft; circulatory arrest    Classical migraine     Essential hypertension     Fibroids     Hypercholesterolemia     Routine Papanicolaou smear 2009 neg     Past Surgical History:   Procedure Laterality Date    EVASC RAAA PSEUDOARYSM ABDL AORTA VISC RS&I  04/25/15    Aortic Valve Repair    HYSTERECTOMY, VAGINAL  2003    MASTOPEXY  09/2006    TONSILLECTOMY         Current Outpatient Medications   Medication Sig Dispense Refill    aspirin 81 MG EC tablet Take by mouth daily      vitamin D 25 MCG (1000 UT) CAPS Take by mouth      coenzyme Q10 100 MG CAPS capsule Take 1 capsule by mouth daily      DULoxetine (CYMBALTA) 60 MG extended release capsule Take 1 capsule by mouth daily      lisinopril (PRINIVIL;ZESTRIL) 5 MG tablet TAKE 1 TABLET BY MOUTH EVERY DAY      omeprazole (PRILOSEC) 40 MG delayed release capsule Take 1 capsule by mouth every morning (before breakfast)      rosuvastatin (CRESTOR) 20 MG tablet Take 1 tablet by mouth      ascorbic acid (VITAMIN C) 500 MG tablet Take by mouth (Patient not taking: Reported on 9/19/2023)      ibuprofen (ADVIL;MOTRIN) 200 MG

## 2023-09-20 RX ORDER — ROSUVASTATIN CALCIUM 20 MG/1
20 TABLET, COATED ORAL
Qty: 90 TABLET | Refills: 1 | Status: SHIPPED | OUTPATIENT
Start: 2023-09-20

## 2023-10-31 RX ORDER — LISINOPRIL 5 MG/1
TABLET ORAL
Qty: 90 TABLET | Refills: 3 | Status: SHIPPED | OUTPATIENT
Start: 2023-10-31

## 2023-10-31 NOTE — TELEPHONE ENCOUNTER
Requested Prescriptions     Signed Prescriptions Disp Refills    lisinopril (PRINIVIL;ZESTRIL) 5 MG tablet 90 tablet 3     Sig: TAKE 1 TABLET BY MOUTH EVERY DAY     Authorizing Provider: Audrey Gunn     Ordering User: Tanisha Beyer

## 2024-01-10 NOTE — PROGRESS NOTES
CARDIOLOGY OFFICE NOTE    Lavon Barroso MD, [facility]    [address], [place] [address]  Phone [phone]; Fax [phone]  Mobile [phone]   Voice Mail [phone]    Primary care: Maxine Hi MD       ATTENTION:   This medical record was transcribed using an electronic medical records/speech recognition system.  Although proofread, it may and can contain electronic, spelling and other errors.  Corrections may be executed at a later time.  Please feel free to contact us for any clarifications as needed.             Muriel Valentin is a 63 y.o. female with  referred for  dyslipidemia aortic aneurysm was in 2015.  Last seen by me 6 months ago.       Cardiac risk factors: family history, dyslipidemia, obesity, sedentary life style, postmenopausal   I have personally obtained the history from the patient.          HISTORY OF PRESENTING ILLNESS    Ms./. Muriel Valentin  63 y.o. is is doing well since I last saw her.  She is status post aortic aneurysm repair in 2015 with her last MRI being in 2022.  She has no interval complaints.         ACTIVE PROBLEM LIST     Patient Active Problem List    Diagnosis Date Noted    Severe obesity (HCC) 08/29/2019    Aortic stenosis 04/29/2015    Postoperative anemia due to acute blood loss 04/27/2015    Ascending aortic dissection (HCC) 04/25/2015    Aortic dissection (HCC) 04/24/2015    Chest pain 04/23/2015    Classical migraine     Anxiety and depression     Hypercholesterolemia            PAST MEDICAL HISTORY     Past Medical History:   Diagnosis Date    Acquired absence of both cervix and uterus     Anxiety and depression     Ascending aortic dissection (HCC) 4/25/2015    Re suspension of aortic valve and 26 mm Hemashield tube graft; circulatory arrest    Classical migraine     Essential hypertension     Fibroids     Hypercholesterolemia     Routine Papanicolaou smear 2009 neg           PAST SURGICAL HISTORY     Past Surgical History:

## 2024-01-11 ENCOUNTER — OFFICE VISIT (OUTPATIENT)
Age: 64
End: 2024-01-11
Payer: COMMERCIAL

## 2024-01-11 VITALS
SYSTOLIC BLOOD PRESSURE: 122 MMHG | OXYGEN SATURATION: 98 % | DIASTOLIC BLOOD PRESSURE: 78 MMHG | HEART RATE: 73 BPM | HEIGHT: 61 IN | BODY MASS INDEX: 35.42 KG/M2 | WEIGHT: 187.6 LBS

## 2024-01-11 DIAGNOSIS — I71.010 DISSECTION OF ASCENDING AORTA (HCC): Primary | ICD-10-CM

## 2024-01-11 DIAGNOSIS — I10 ESSENTIAL (PRIMARY) HYPERTENSION: ICD-10-CM

## 2024-01-11 DIAGNOSIS — I65.23 OCCLUSION AND STENOSIS OF BILATERAL CAROTID ARTERIES: ICD-10-CM

## 2024-01-11 DIAGNOSIS — E78.00 PURE HYPERCHOLESTEROLEMIA: ICD-10-CM

## 2024-01-11 PROCEDURE — 99214 OFFICE O/P EST MOD 30 MIN: CPT | Performed by: SPECIALIST

## 2024-01-11 PROCEDURE — 93000 ELECTROCARDIOGRAM COMPLETE: CPT | Performed by: SPECIALIST

## 2024-01-11 PROCEDURE — 3074F SYST BP LT 130 MM HG: CPT | Performed by: SPECIALIST

## 2024-01-11 PROCEDURE — 3078F DIAST BP <80 MM HG: CPT | Performed by: SPECIALIST

## 2024-01-11 RX ORDER — GABAPENTIN 100 MG/1
100 CAPSULE ORAL DAILY
COMMUNITY

## 2024-01-11 NOTE — PATIENT INSTRUCTIONS
1) peter neal pod cast    It is  my pleasure to have the opportunity to be involved in taking care of you and to provide you the best cardiac care.    At the end of every visit I  encourage you to eat healthy and clean and reduce your red meat intake as well as exercise 30 minutes 5 days a week to ensure a healthy heart.  If you are a smoker , it will be essential that you stop smoking to reduce your risk of heart disease.      Part of providing you the best heart care possible IS AN ACCURATE KNOWLEDGE OF YOUR MEDICINE. It  will be  essential at each office visit that you provide me with an accurate list of your medicines.  When you come into the office you should have that list or another option is lining up your pill bottles  and taking a snapshot with your cell phone of all the medicines you are taking currently and show it to the nurse in the examining room.    Inaccurate reporting of your medication to the nurse may lead to adverse events and medical errors.      Thank you again for giving me the opportunity to be part of your heart care and it is my pleasure.    All the best,    Lavon Barroso MD

## 2024-01-11 NOTE — PROGRESS NOTES
Chief Complaint   Patient presents with    Hyperlipidemia    Hypertension    Other     AAD     Vitals:    24 0815   BP: 122/78   Site: Left Upper Arm   Position: Sitting   Pulse: 73   SpO2: 98%   Weight: 85.1 kg (187 lb 9.6 oz)   Height: 1.549 m (5' 1\")       Chest pain: DENIED     Recent hospital stays: DENIED     Refills: DENIED     NAME Muriel Valentin         1960      MRN    [id number]      LAST OFFICE APPOINTMENT: 1/10/2023     DIAGNOSIS    ICD-10-CM    1. Dissection of ascending aorta (HCC)  I71.010       2. Essential (primary) hypertension  I10 EKG 12 Lead      3. Pure hypercholesterolemia  E78.00       4. Occlusion and stenosis of bilateral carotid arteries  I65.23           HOME MEDICATION  Current Outpatient Medications   Medication Sig    gabapentin (NEURONTIN) 100 MG capsule Take 1 capsule by mouth daily.    lisinopril (PRINIVIL;ZESTRIL) 5 MG tablet TAKE 1 TABLET BY MOUTH EVERY DAY    rosuvastatin (CRESTOR) 20 MG tablet TAKE 1 TABLET BY MOUTH NIGHTLY    aspirin 81 MG EC tablet Take by mouth daily    vitamin D 25 MCG (1000 UT) CAPS Take by mouth    coenzyme Q10 100 MG CAPS capsule Take 1 capsule by mouth daily    DULoxetine (CYMBALTA) 60 MG extended release capsule Take 1 capsule by mouth daily    metoprolol tartrate (LOPRESSOR) 50 MG tablet TAKE 1 TABLET BY MOUTH TWICE A DAY    omeprazole (PRILOSEC) 40 MG delayed release capsule Take 1 capsule by mouth every morning (before breakfast)     No current facility-administered medications for this visit.       VITAL SIGNS  Wt Readings from Last 3 Encounters:   24 85.1 kg (187 lb 9.6 oz)   23 85.5 kg (188 lb 8 oz)   01/10/23 83.5 kg (184 lb)     BP Readings from Last 3 Encounters:   24 122/78   23 122/70   01/10/23 138/70     Pulse Readings from Last 3 Encounters:   24 73   01/10/23 69   22 64         SPECIALTY COMMENTS   1. Echocardiogram    5/22/15 - 45-55% LAE, mild to moderate MR    16- EF 60%, MR/TR/HI

## 2024-01-19 RX ORDER — ROSUVASTATIN CALCIUM 20 MG/1
20 TABLET, COATED ORAL NIGHTLY
Qty: 90 TABLET | Refills: 0 | Status: SHIPPED | OUTPATIENT
Start: 2024-01-19

## 2024-04-08 RX ORDER — METOPROLOL TARTRATE 50 MG/1
TABLET, FILM COATED ORAL
Qty: 180 TABLET | Refills: 2 | Status: SHIPPED | OUTPATIENT
Start: 2024-04-08

## 2024-06-03 RX ORDER — ROSUVASTATIN CALCIUM 20 MG/1
20 TABLET, COATED ORAL NIGHTLY
Qty: 90 TABLET | Refills: 0 | OUTPATIENT
Start: 2024-06-03

## 2024-06-28 LAB
CHOLEST SERPL-MCNC: 125 MG/DL (ref 100–199)
HDLC SERPL-MCNC: 38 MG/DL
IMP & REVIEW OF LAB RESULTS: NORMAL
LDLC SERPL CALC-MCNC: 65 MG/DL (ref 0–99)
TRIGL SERPL-MCNC: 124 MG/DL (ref 0–149)
VLDLC SERPL CALC-MCNC: 22 MG/DL (ref 5–40)

## 2024-07-11 RX ORDER — ROSUVASTATIN CALCIUM 20 MG/1
20 TABLET, COATED ORAL NIGHTLY
Qty: 90 TABLET | Refills: 1 | Status: SHIPPED | OUTPATIENT
Start: 2024-07-11

## 2024-08-06 RX ORDER — LISINOPRIL 5 MG/1
TABLET ORAL
Qty: 90 TABLET | Refills: 1 | Status: SHIPPED | OUTPATIENT
Start: 2024-08-06

## 2024-08-27 ENCOUNTER — HOSPITAL ENCOUNTER (OUTPATIENT)
Facility: HOSPITAL | Age: 64
Discharge: HOME | End: 2024-08-30
Attending: SPECIALIST

## 2024-08-27 DIAGNOSIS — I71.010 DISSECTION OF ASCENDING AORTA (HCC): Primary | ICD-10-CM

## 2024-08-27 DIAGNOSIS — I35.0 NONRHEUMATIC AORTIC (VALVE) STENOSIS: ICD-10-CM

## 2024-08-27 DIAGNOSIS — I71.010 DISSECTION OF ASCENDING AORTA (HCC): ICD-10-CM

## 2024-11-12 RX ORDER — ROSUVASTATIN CALCIUM 20 MG/1
20 TABLET, COATED ORAL NIGHTLY
Qty: 90 TABLET | Refills: 1 | OUTPATIENT
Start: 2024-11-12

## 2024-11-25 ENCOUNTER — HOSPITAL ENCOUNTER (OUTPATIENT)
Facility: HOSPITAL | Age: 64
Discharge: HOME | End: 2024-11-28
Attending: INTERNAL MEDICINE
Payer: COMMERCIAL

## 2024-11-25 DIAGNOSIS — I35.0 NONRHEUMATIC AORTIC (VALVE) STENOSIS: ICD-10-CM

## 2024-11-25 DIAGNOSIS — I71.010 DISSECTION OF ASCENDING AORTA (HCC): ICD-10-CM

## 2024-11-25 PROCEDURE — 75557 CARDIAC MRI FOR MORPH: CPT

## 2024-11-25 PROCEDURE — 75557 CARDIAC MRI FOR MORPH: CPT | Performed by: INTERNAL MEDICINE

## 2024-11-25 NOTE — RESULT ENCOUNTER NOTE
Jose J Llamas,    I received the results of your cardiac MRI.  It essentially seems unchanged as compared to prior studies in 2022 in 2019.    Look forward to meeting you.    Dr. Cruz

## 2024-12-06 RX ORDER — METOPROLOL TARTRATE 50 MG
50 TABLET ORAL 2 TIMES DAILY
Qty: 180 TABLET | Refills: 0 | Status: SHIPPED | OUTPATIENT
Start: 2024-12-06

## 2025-04-15 RX ORDER — LISINOPRIL 5 MG/1
5 TABLET ORAL DAILY
Qty: 90 TABLET | Refills: 1 | OUTPATIENT
Start: 2025-04-15

## 2025-06-11 RX ORDER — LISINOPRIL 5 MG/1
5 TABLET ORAL DAILY
Qty: 90 TABLET | Refills: 1 | OUTPATIENT
Start: 2025-06-11

## 2025-06-11 NOTE — TELEPHONE ENCOUNTER
Per verbal order from Dr. Olivia Cruz  Last appt: 1/11/2024     Future Appointments   Date Time Provider Department Center   9/2/2025  2:20 PM DINESH FULLER [facility] AM[facility]   9/9/2025  2:30 PM Darling Freed MD [facility] AMB       Requested Prescriptions     Refused Prescriptions Disp Refills    lisinopril (PRINIVIL;ZESTRIL) 5 MG tablet [Pharmacy Med Name: LISINOPRIL 5 MG TABLET] 90 tablet 1     Sig: TAKE 1 TABLET BY MOUTH EVERY DAY     Refused By: TARYN MAST     Reason for Refusal: Patient needs an appointment